# Patient Record
Sex: MALE | Race: WHITE | NOT HISPANIC OR LATINO | Employment: OTHER | URBAN - NONMETROPOLITAN AREA
[De-identification: names, ages, dates, MRNs, and addresses within clinical notes are randomized per-mention and may not be internally consistent; named-entity substitution may affect disease eponyms.]

---

## 2017-04-14 ENCOUNTER — OFFICE VISIT (OUTPATIENT)
Dept: CARDIOLOGY | Facility: CLINIC | Age: 82
End: 2017-04-14
Payer: MEDICARE

## 2017-04-14 VITALS
OXYGEN SATURATION: 94 % | HEIGHT: 69 IN | DIASTOLIC BLOOD PRESSURE: 80 MMHG | WEIGHT: 199 LBS | HEART RATE: 95 BPM | SYSTOLIC BLOOD PRESSURE: 130 MMHG | BODY MASS INDEX: 29.47 KG/M2

## 2017-04-14 DIAGNOSIS — R00.0 SINUS TACHYCARDIA: ICD-10-CM

## 2017-04-14 DIAGNOSIS — R60.0 BILATERAL EDEMA OF LOWER EXTREMITY: ICD-10-CM

## 2017-04-14 DIAGNOSIS — I10 ESSENTIAL HYPERTENSION: ICD-10-CM

## 2017-04-14 LAB — EKG IMPRESSION: NORMAL

## 2017-04-14 PROCEDURE — 4040F PNEUMOC VAC/ADMIN/RCVD: CPT | Mod: 8P | Performed by: INTERNAL MEDICINE

## 2017-04-14 PROCEDURE — 1036F TOBACCO NON-USER: CPT | Performed by: INTERNAL MEDICINE

## 2017-04-14 PROCEDURE — 99204 OFFICE O/P NEW MOD 45 MIN: CPT | Performed by: INTERNAL MEDICINE

## 2017-04-14 PROCEDURE — G8419 CALC BMI OUT NRM PARAM NOF/U: HCPCS | Performed by: INTERNAL MEDICINE

## 2017-04-14 PROCEDURE — G8432 DEP SCR NOT DOC, RNG: HCPCS | Performed by: INTERNAL MEDICINE

## 2017-04-14 PROCEDURE — 1101F PT FALLS ASSESS-DOCD LE1/YR: CPT | Mod: 8P | Performed by: INTERNAL MEDICINE

## 2017-04-14 PROCEDURE — 93000 ELECTROCARDIOGRAM COMPLETE: CPT | Performed by: INTERNAL MEDICINE

## 2017-04-14 RX ORDER — HYDROCORTISONE VALERATE CREAM 2 MG/G
CREAM TOPICAL PRN
COMMUNITY
Start: 2017-02-09 | End: 2019-01-24

## 2017-04-14 RX ORDER — LORATADINE 10 MG/1
TABLET ORAL
COMMUNITY
Start: 2017-03-07

## 2017-04-14 RX ORDER — TRIAMCINOLONE ACETONIDE 1 MG/G
CREAM TOPICAL
COMMUNITY
Start: 2017-03-15 | End: 2018-12-03

## 2017-04-14 RX ORDER — AMLODIPINE BESYLATE 5 MG/1
5 TABLET ORAL DAILY
Qty: 90 TAB | Refills: 3 | Status: SHIPPED | OUTPATIENT
Start: 2017-04-14 | End: 2018-05-09 | Stop reason: SDUPTHER

## 2017-04-14 RX ORDER — LISINOPRIL 40 MG/1
40 TABLET ORAL DAILY
Qty: 90 TAB | Refills: 3 | Status: SHIPPED | OUTPATIENT
Start: 2017-04-14 | End: 2018-05-09 | Stop reason: SDUPTHER

## 2017-04-14 ASSESSMENT — ENCOUNTER SYMPTOMS
ORTHOPNEA: 0
ABDOMINAL PAIN: 0
FALLS: 0
DIZZINESS: 0
PALPITATIONS: 0
SHORTNESS OF BREATH: 0
DEPRESSION: 0
PND: 0
BRUISES/BLEEDS EASILY: 0
LOSS OF CONSCIOUSNESS: 0

## 2017-04-14 NOTE — MR AVS SNAPSHOT
"        Feliciano Rojas   2017 9:40 AM   Office Visit   MRN: [id number]    Department:  Heart [facility]   Dept Phone:  [phone]    Description:  Male : 1935   Provider:  Anitra Ferrara M.D.           Reason for Visit     New Patient           Allergies as of 2017     No Known Allergies      You were diagnosed with     Essential hypertension   [2095828]       Bilateral edema of lower extremity   [850377]       Sinus tachycardia   [997809]         Vital Signs     Blood Pressure Pulse Height Weight Body Mass Index Oxygen Saturation    130/80 mmHg 95 1.753 m (5' 9.02\") 90.266 kg (199 lb) 29.37 kg/m2 94%    Smoking Status                   Former Smoker           Basic Information     Date Of Birth Sex Race Ethnicity Preferred Language    1935 Male White Non- English      Your appointments     2017  9:30 AM   Echo with ECHO CV   ECHO [facility] (--)    [address]  [facility] [address]   [phone]            Aug 08, 2017 10:00 AM   FOLLOW UP with Anitra Ferrara M.D.   [facility]    [address]  [facility] [id number] [phone]              Health Maintenance     Patient has no pending health maintenance at this time      Results     EKG      Component    Report    [facility]    Test Date:  2017  Pt Name:    FELICIANO ROJAS                  Department: [facility]  MRN:        [id number]                      Room:  Gender:                                 Technician: ANDRES  :        1935                   Requested By:ANITRA FERRARA  Order #:    [id number]                    Reading MD: Oliver Chin MD    Measurements  Intervals                                Axis  Rate:       82                           P:          73  OK:         164                          QRS:        81  QRSD:       86                           T:          49  QT:         372  QTc:        435    Interpretive " Statements  SINUS RHYTHM  BORDERLINE RIGHT AXIS DEVIATION  No previous ECG available for comparison    Electronically Signed On 4- 9:57:44 PDT by Oliver Chin MD                          Current Immunizations     No immunizations on file.      Below and/or attached are the medications your provider expects you to take. Review all of your home medications and newly ordered medications with your provider and/or pharmacist. Follow medication instructions as directed by your provider and/or pharmacist. Please keep your medication list with you and share with your provider. Update the information when medications are discontinued, doses are changed, or new medications (including over-the-counter products) are added; and carry medication information at all times in the event of emergency situations     Allergies:  No Known Allergies          Medications  Valid as of: April 14, 2017 - 10:10 AM    Generic Name Brand Name Tablet Size Instructions for use    AmLODIPine Besylate (Tab) NORVASC 5 MG Take 1 Tab by mouth every day.        Aspirin (Tablet Delayed Response) ECOTRIN 81 MG Take 81 mg by mouth every day.        Famotidine (Tab) PEPCID 20 MG Take 20 mg by mouth 2 times a day.        Hydrocortisone Valerate (Cream) WESTCORT 0.2 %         Lisinopril (Tab) PRINIVIL, ZESTRIL 40 MG Take 1 Tab by mouth every day.        Loratadine (Tab) CLARITIN 10 MG         Lovastatin (Tab) MEVACOR 20 MG Take 20 mg by mouth every evening.        Non Formulary Request Non Formulary Request  loratidine takes daily        Ondansetron HCl (Tab) ZOFRAN 4 MG Take 1 Tab by mouth every four hours as needed for Nausea/Vomiting.        ROPINIRole HCl (Tab) REQUIP 4 MG         Terazosin HCl (Cap) HYTRIN 10 MG Take 10 mg by mouth every evening.        Triamcinolone Acetonide (Cream) KENALOG 0.1 %         .                 Medicines prescribed today were sent to:     [facility] - [facility], [place] - [address] [place] [address]  [facility] [address]    Phone: [phone] Fax: [phone]    Open 24 Hours?: No      Medication refill instructions:       If your prescription bottle indicates you have medication refills left, it is not necessary to call your provider’s office. Please contact your pharmacy and they will refill your medication.    If your prescription bottle indicates you do not have any refills left, you may request refills at any time through one of the following ways: The online TrialPay system (except Urgent Care), by calling your provider’s office, or by asking your pharmacy to contact your provider’s office with a refill request. Medication refills are processed only during regular business hours and may not be available until the next business day. Your provider may request additional information or to have a follow-up visit with you prior to refilling your medication.   *Please Note: Medication refills are assigned a new Rx number when refilled electronically. Your pharmacy may indicate that no refills were authorized even though a new prescription for the same medication is available at the pharmacy. Please request the medicine by name with the pharmacy before contacting your provider for a refill.        Your To Do List     Future Labs/Procedures Complete By Expires    ECHOCARDIOGRAM COMP W/O CONT  As directed 4/14/2018    TSH WITH REFLEX TO FT4  As directed 4/14/2018         TrialPay Access Code: [email or web address]  Expires: 4/30/2017 11:27 AM    TrialPay  A secure, online tool to manage your health information     [employer]’s TrialPay® is a secure, online tool that connects you to your personalized health information from the privacy of your home -- day or night - making it very easy for you to manage your healthcare. Once the activation process is completed, you can even access your medical information using the TrialPay arthur, which is available for free in the Apple Arthur store or Google Play store.      [employer] provides the following levels of access (as shown below):   My Chart Features   [facility] Doctor [facility]  Specialists [facility]  Urgent  Care Non-[facility]  Primary Care  Doctor   Email your healthcare team securely and privately 24/7 X X X    Manage appointments: schedule your next appointment; view details of past/upcoming appointments X      Request prescription refills. X      View recent personal medical records, including lab and immunizations X X X X   View health record, including health history, allergies, medications X X X X   Read reports about your outpatient visits, procedures, consult and ER notes X X X X   See your discharge summary, which is a recap of your hospital and/or ER visit that includes your diagnosis, lab results, and care plan. X X       How to register for [employer]:  1. Go to  [email or web address].  2. Click on the Sign Up Now box, which takes you to the New Member Sign Up page. You will need to provide the following information:  a. Enter your [employer] Access Code exactly as it appears at the top of this page. (You will not need to use this code after you’ve completed the sign-up process. If you do not sign up before the expiration date, you must request a new code.)   b. Enter your date of birth.   c. Enter your home email address.   d. Click Submit, and follow the next screen’s instructions.  3. Create a [employer] ID. This will be your [employer] login ID and cannot be changed, so think of one that is secure and easy to remember.  4. Create a [employer] password. You can change your password at any time.  5. Enter your Password Reset Question and Answer. This can be used at a later time if you forget your password.   6. Enter your e-mail address. This allows you to receive e-mail notifications when new information is available in [employer].  7. Click Sign Up. You can now view your health information.    For assistance activating your [employer] account, call [phone]

## 2017-04-14 NOTE — PROGRESS NOTES
Subjective:   Feliciano Rojas is a 81 y.o. male who presents today             Past Medical History   Diagnosis Date   • Hypertension    • Cancer (CMS-HCC)      prostate ca   • Pneumonia    • Vertigo    • Indigestion    • BPH (benign prostatic hyperplasia)    • High cholesterol      Past Surgical History   Procedure Laterality Date   • Prostate cancer biomarker       seed implants prostate   • Appendectomy      • Cataract extraction with iol Bilateral    • Carpal tunnel release Right 12/17/2015     Procedure: CARPAL TUNNEL RELEASE;  Surgeon: Jeromy Santana D.O.;  Location: SURGERY [facility];  Service:      History reviewed. No pertinent family history.  History   Smoking status   • Former Smoker -- 2.00 packs/day for 15 years   • Types: Cigarettes   • Quit date: 12/16/1970   Smokeless tobacco   • Never Used     No Known Allergies  Outpatient Encounter Prescriptions as of 4/14/2017   Medication Sig Dispense Refill   • REQUIP 4 MG tablet      • loratadine (CLARITIN) 10 MG Tab      • amlodipine (NORVASC) 5 MG Tab Take 1 Tab by mouth every day. 90 Tab 3   • lisinopril (PRINIVIL, ZESTRIL) 40 MG tablet Take 1 Tab by mouth every day. 90 Tab 3   • terazosin (HYTRIN) 10 MG capsule Take 10 mg by mouth every evening.     • lovastatin (MEVACOR) 20 MG TABS Take 20 mg by mouth every evening.     • Non Formulary Request loratidine takes daily     • famotidine (PEPCID) 20 MG TABS Take 20 mg by mouth 2 times a day.     • aspirin EC (ECOTRIN) 81 MG TBEC Take 81 mg by mouth every day.     • hydrocortisone (WESTCORT) 0.2 % cream      • triamcinolone acetonide (KENALOG) 0.1 % Cream      • [DISCONTINUED] lisinopril (PRINIVIL) 20 MG Tab Take 40 mg by mouth every day.     • ondansetron (ZOFRAN) 4 MG TABS Take 1 Tab by mouth every four hours as needed for Nausea/Vomiting. 20 Tab 1   • [DISCONTINUED] amlodipine (NORVASC) 10 MG TABS Take 5 mg by mouth every day.       No facility-administered encounter medications on file as of  "4/14/2017.     Review of Systems   Constitutional: Negative for malaise/fatigue.   HENT: Negative.    Respiratory: Negative for shortness of breath.    Cardiovascular: Positive for leg swelling. Negative for chest pain, palpitations, orthopnea and PND.   Gastrointestinal: Negative for abdominal pain.   Musculoskeletal: Negative for falls.   Skin: Negative.    Neurological: Negative for dizziness and loss of consciousness.   Endo/Heme/Allergies: Does not bruise/bleed easily.   Psychiatric/Behavioral: Negative for depression.   All other systems reviewed and are negative.       Objective:   /80 mmHg  Pulse 95  Ht 1.753 m (5' 9.02\")  Wt 90.266 kg (199 lb)  BMI 29.37 kg/m2  SpO2 94%    Physical Exam   Constitutional: He is oriented to person, place, and time. No distress.   HENT:   Head: Normocephalic and atraumatic.   Eyes: Conjunctivae are normal.   Neck: Normal range of motion. Neck supple. No JVD present.   Cardiovascular: Normal rate, regular rhythm and normal heart sounds.  Exam reveals no gallop and no friction rub.    No murmur heard.  Pulmonary/Chest: Effort normal and breath sounds normal. No respiratory distress. He has no wheezes. He has no rales.   Abdominal: Soft. There is no tenderness.   Musculoskeletal: He exhibits edema (1+ b/l).   Neurological: He is alert and oriented to person, place, and time.   Skin: Skin is warm and dry. He is not diaphoretic.   Psychiatric: He has a normal mood and affect.   Nursing note and vitals reviewed.    Recent labs reviewed. CBC unremarkable except hemoglobin of 12.4. Chem-7 unremarkable. Creatinine 1.3, unchanged when compared to studies from last year.    ECG from today was reviewed and showed normal sinus rhythm at 82 bpm, otherwise normal.      Assessment:     1. Essential hypertension  EKG    amlodipine (NORVASC) 5 MG Tab    lisinopril (PRINIVIL, ZESTRIL) 40 MG tablet   2. Bilateral edema of lower extremity  ECHOCARDIOGRAM COMP W/O CONT   3. Sinus " tachycardia  TSH WITH REFLEX TO FT4       Medical Decision Making:  Today's Assessment / Status / Plan:     Bilateral lower extremity edema-likely secondary to use of high-dose amlodipine. Agree with decreasing amlodipine dose to 5 mg which has already been done. His edema could also be secondary to venous stasis. I advised patient about some lifestyle changes to help decrease the edema. Agree with use of compression stockings. Patient doesn't have any other symptoms distress heart failure however, I will obtain an echocardiogram to evaluate his LV function.     Patient reports a history of elevated heart rates. He reports that his heart rate is high 90s to over 100 bpm. No associated symptoms. Will obtain TSH.     Hypertension-blood pressure at goal. Continue current regimen. Patient would like to follow me long-term for hypertension management and I have reordered his medications per his request.    Return to clinic in 3 months or prn.    Thank you for allowing me to participate in the care of this patient. Please do not hesitate to contact me with any questions.    Anitra Ellis MD  Cardiologist  [facility]

## 2017-04-28 ENCOUNTER — TELEPHONE (OUTPATIENT)
Dept: CARDIOLOGY | Facility: MEDICAL CENTER | Age: 82
End: 2017-04-28

## 2017-04-28 NOTE — TELEPHONE ENCOUNTER
----- Message from Grace Sims, Med Ass't sent at 4/28/2017  9:16 AM PDT -----  Regarding: ECHO RESULTS  Good morning, pt called and would like a call back in regards to his echo test.      Thank you    Leslye          Above note to Dr. Ellis to review echo done in [facility].     jlf    Dr. Ellis reviews patients echo,  Echo looks good and no changes.  Advised to keep f/u appt as scheduled.  Patient is notified of the same.   jlf

## 2017-09-01 ENCOUNTER — OFFICE VISIT (OUTPATIENT)
Dept: CARDIOLOGY | Facility: CLINIC | Age: 82
End: 2017-09-01
Payer: MEDICARE

## 2017-09-01 VITALS
BODY MASS INDEX: 29.62 KG/M2 | WEIGHT: 200 LBS | DIASTOLIC BLOOD PRESSURE: 76 MMHG | HEART RATE: 92 BPM | HEIGHT: 69 IN | OXYGEN SATURATION: 93 % | SYSTOLIC BLOOD PRESSURE: 138 MMHG

## 2017-09-01 DIAGNOSIS — R60.0 BILATERAL EDEMA OF LOWER EXTREMITY: ICD-10-CM

## 2017-09-01 DIAGNOSIS — E78.5 OTHER AND UNSPECIFIED HYPERLIPIDEMIA: ICD-10-CM

## 2017-09-01 DIAGNOSIS — I10 ESSENTIAL HYPERTENSION: ICD-10-CM

## 2017-09-01 PROCEDURE — 99214 OFFICE O/P EST MOD 30 MIN: CPT | Performed by: INTERNAL MEDICINE

## 2017-09-01 RX ORDER — DULOXETIN HYDROCHLORIDE 30 MG/1
30 CAPSULE, DELAYED RELEASE ORAL DAILY
COMMUNITY

## 2017-09-01 ASSESSMENT — ENCOUNTER SYMPTOMS
DEPRESSION: 0
BRUISES/BLEEDS EASILY: 0
LOSS OF CONSCIOUSNESS: 0
PND: 0
FALLS: 0
ORTHOPNEA: 0
SHORTNESS OF BREATH: 0
ABDOMINAL PAIN: 0
PALPITATIONS: 0
DIZZINESS: 0

## 2017-09-01 NOTE — PROGRESS NOTES
Subjective:   Feliciano Rojas is a 81 y.o. male With past medical history significant for hypertension and hyperlipidemia who is planning to clinic for follow-up. Since his last visit, his lower extremity edema has essentially resolved. He denies any PND or orthopnea. No chest discomfort, dyspnea, palpitations or dizziness.    He does add salt to most of his meals. He is trying to reduce his salt intake however.     He does not exercise, but does like to go shopping about twice a week and at that time walks the entire store without any symptoms.    Past Medical History:   Diagnosis Date   • BPH (benign prostatic hyperplasia)    • Cancer (CMS-HCC)     prostate ca   • High cholesterol    • Hypertension    • Indigestion    • Pneumonia    • Vertigo      Past Surgical History:   Procedure Laterality Date   • CARPAL TUNNEL RELEASE Right 12/17/2015    Procedure: CARPAL TUNNEL RELEASE;  Surgeon: Jeromy Santana D.O.;  Location: SURGERY [facility];  Service:    • APPENDECTOMY      • CATARACT EXTRACTION WITH IOL Bilateral    • PROSTATE CANCER BIOMARKER      seed implants prostate     History reviewed. No pertinent family history.  History   Smoking Status   • Former Smoker   • Packs/day: 2.00   • Years: 15.00   • Types: Cigarettes   • Quit date: 12/16/1970   Smokeless Tobacco   • Never Used     Occasionally alcohol use. No recreational drug use.    No Known Allergies  Outpatient Encounter Prescriptions as of 9/1/2017   Medication Sig Dispense Refill   • SIMVASTATIN PO Take  by mouth.     • duloxetine (CYMBALTA) 30 MG Cap DR Particles Take 30 mg by mouth every day.     • REQUIP 4 MG tablet      • hydrocortisone (WESTCORT) 0.2 % cream      • triamcinolone acetonide (KENALOG) 0.1 % Cream      • loratadine (CLARITIN) 10 MG Tab      • amlodipine (NORVASC) 5 MG Tab Take 1 Tab by mouth every day. 90 Tab 3   • lisinopril (PRINIVIL, ZESTRIL) 40 MG tablet Take 1 Tab by mouth every day. 90 Tab 3   • terazosin (HYTRIN) 10 MG  "capsule Take 10 mg by mouth every evening.     • famotidine (PEPCID) 20 MG TABS Take 20 mg by mouth 2 times a day.     • aspirin EC (ECOTRIN) 81 MG TBEC Take 81 mg by mouth every day.     • [DISCONTINUED] lovastatin (MEVACOR) 20 MG TABS Take 20 mg by mouth every evening.     • [DISCONTINUED] Non Formulary Request loratidine takes daily     • [DISCONTINUED] ondansetron (ZOFRAN) 4 MG TABS Take 1 Tab by mouth every four hours as needed for Nausea/Vomiting. 20 Tab 1     No facility-administered encounter medications on file as of 9/1/2017.      Review of Systems   Constitutional: Negative for malaise/fatigue.   HENT: Negative.    Respiratory: Negative for shortness of breath.    Cardiovascular: Negative for chest pain, palpitations, orthopnea, leg swelling and PND.   Gastrointestinal: Negative for abdominal pain.   Musculoskeletal: Negative for falls.   Skin: Negative.    Neurological: Negative for dizziness and loss of consciousness.   Endo/Heme/Allergies: Does not bruise/bleed easily.   Psychiatric/Behavioral: Negative for depression.   All other systems reviewed and are negative.       Objective:   /76   Pulse 92   Ht 1.753 m (5' 9\")   Wt 90.7 kg (200 lb)   SpO2 93%   BMI 29.53 kg/m²     Physical Exam   Constitutional: He is oriented to person, place, and time. No distress.   HENT:   Head: Normocephalic and atraumatic.   Eyes: Conjunctivae are normal.   Neck: Normal range of motion. Neck supple. No JVD present.   Cardiovascular: Normal rate, regular rhythm and normal heart sounds.  Exam reveals no gallop and no friction rub.    No murmur heard.  Pulmonary/Chest: Effort normal and breath sounds normal. No respiratory distress. He has no wheezes. He has no rales.   Abdominal: Soft. There is no tenderness.   Musculoskeletal: He exhibits no edema.   Neurological: He is alert and oriented to person, place, and time.   Skin: Skin is warm and dry. He is not diaphoretic.   Psychiatric: He has a normal mood and " affect.   Nursing note and vitals reviewed.    Labs in April 2017. Hemoglobin normal. Creatinine normal.   TSH normal.     ECG from April 2017 showed normal sinus rhythm at 82 bpm, otherwise normal.    Echocardiogram in April 2017. Report reviewed. Showed mild concentric LVH. EF 70%.  Mild RVH with mild reduction in RV function. RVSP 34 mmHg.    Assessment:     1. Essential hypertension     2. Bilateral edema of lower extremity     3. Other and unspecified hyperlipidemia         Medical Decision Making:  Today's Assessment / Status / Plan:     Patient's bilateral lower extremity edema has not resolved. It was likely secondary to high dose amlodipine. He is tolerating the 5 mg of amlodipine well. I strongly advised the patient to limit his salt intake. His echocardiogram showed normal LV function.    His blood pressure is at goal. Continue current regimen.    He is currently on simvastatin and is tolerating it well. No recent lipid panel in the system.    Patient is requesting refills of his duloxetine. I explained to the patient that I cannot prescribe that medicine for him. However I will forward his request to patient's primary care physician.    Return to clinic in 6 months or earlier if needed.    Thank you for allowing me to participate in the care of this patient. Please do not hesitate to contact me with any questions.    Anitra Ellis MD  Cardiologist  [facility]      PLEASE NOTE: This dictation was created using voice recognition software.

## 2017-09-01 NOTE — LETTER
[facility] [place] [address],   [address] [place] [address]  Phone: [phone]  Fax: [phone]              Dear Dr. Jordan,     It was a pleasure to see your patient in Cardiology clinic. Please see my note from today attached below.     Patient is requesting refills on his Duloxetine 30mg once a day. He would like a 90 day supply.   [facility] and would [facility] to get it through [facility] now. I will defer to you about refilling this medication. Thank you    Sincerely,    Anitra Ellis MD        Feliciano Rojas  1935    Encounter Date: 9/1/2017      PROGRESS NOTE:  Subjective:   Feliciano Rojas is a 81 y.o. male With past medical history significant for hypertension and hyperlipidemia who is planning to clinic for follow-up. Since his last visit, his lower extremity edema has essentially resolved. He denies any PND or orthopnea. No chest discomfort, dyspnea, palpitations or dizziness.    He does add salt to most of his meals. He is trying to reduce his salt intake however.     He does not exercise, but does like to go shopping about twice a week and at that time walks the entire store without any symptoms.    Past Medical History:   Diagnosis Date   • BPH (benign prostatic hyperplasia)    • Cancer (CMS-HCC)     prostate ca   • High cholesterol    • Hypertension    • Indigestion    • Pneumonia    • Vertigo      Past Surgical History:   Procedure Laterality Date   • CARPAL TUNNEL RELEASE Right 12/17/2015    Procedure: CARPAL TUNNEL RELEASE;  Surgeon: Jeromy Santana D.O.;  Location: SURGERY [facility];  Service:    • APPENDECTOMY      • CATARACT EXTRACTION WITH IOL Bilateral    • PROSTATE CANCER BIOMARKER      seed implants prostate     History reviewed. No pertinent family history.  History   Smoking Status   • Former Smoker   • Packs/day: 2.00   • Years: 15.00   • Types: Cigarettes   • Quit  "date: 12/16/1970   Smokeless Tobacco   • Never Used     Occasionally alcohol use. No recreational drug use.    No Known Allergies  Outpatient Encounter Prescriptions as of 9/1/2017   Medication Sig Dispense Refill   • SIMVASTATIN PO Take  by mouth.     • duloxetine (CYMBALTA) 30 MG Cap DR Particles Take 30 mg by mouth every day.     • REQUIP 4 MG tablet      • hydrocortisone (WESTCORT) 0.2 % cream      • triamcinolone acetonide (KENALOG) 0.1 % Cream      • loratadine (CLARITIN) 10 MG Tab      • amlodipine (NORVASC) 5 MG Tab Take 1 Tab by mouth every day. 90 Tab 3   • lisinopril (PRINIVIL, ZESTRIL) 40 MG tablet Take 1 Tab by mouth every day. 90 Tab 3   • terazosin (HYTRIN) 10 MG capsule Take 10 mg by mouth every evening.     • famotidine (PEPCID) 20 MG TABS Take 20 mg by mouth 2 times a day.     • aspirin EC (ECOTRIN) 81 MG TBEC Take 81 mg by mouth every day.     • [DISCONTINUED] lovastatin (MEVACOR) 20 MG TABS Take 20 mg by mouth every evening.     • [DISCONTINUED] Non Formulary Request loratidine takes daily     • [DISCONTINUED] ondansetron (ZOFRAN) 4 MG TABS Take 1 Tab by mouth every four hours as needed for Nausea/Vomiting. 20 Tab 1     No facility-administered encounter medications on file as of 9/1/2017.      Review of Systems   Constitutional: Negative for malaise/fatigue.   HENT: Negative.    Respiratory: Negative for shortness of breath.    Cardiovascular: Negative for chest pain, palpitations, orthopnea, leg swelling and PND.   Gastrointestinal: Negative for abdominal pain.   Musculoskeletal: Negative for falls.   Skin: Negative.    Neurological: Negative for dizziness and loss of consciousness.   Endo/Heme/Allergies: Does not bruise/bleed easily.   Psychiatric/Behavioral: Negative for depression.   All other systems reviewed and are negative.       Objective:   /76   Pulse 92   Ht 1.753 m (5' 9\")   Wt 90.7 kg (200 lb)   SpO2 93%   BMI 29.53 kg/m²      Physical Exam   Constitutional: He is " oriented to person, place, and time. No distress.   HENT:   Head: Normocephalic and atraumatic.   Eyes: Conjunctivae are normal.   Neck: Normal range of motion. Neck supple. No JVD present.   Cardiovascular: Normal rate, regular rhythm and normal heart sounds.  Exam reveals no gallop and no friction rub.    No murmur heard.  Pulmonary/Chest: Effort normal and breath sounds normal. No respiratory distress. He has no wheezes. He has no rales.   Abdominal: Soft. There is no tenderness.   Musculoskeletal: He exhibits no edema.   Neurological: He is alert and oriented to person, place, and time.   Skin: Skin is warm and dry. He is not diaphoretic.   Psychiatric: He has a normal mood and affect.   Nursing note and vitals reviewed.    Labs in April 2017. Hemoglobin normal. Creatinine normal.   TSH normal.     ECG from April 2017 showed normal sinus rhythm at 82 bpm, otherwise normal.    Echocardiogram in April 2017. Report reviewed. Showed mild concentric LVH. EF 70%.  Mild RVH with mild reduction in RV function. RVSP 34 mmHg.    Assessment:     1. Essential hypertension     2. Bilateral edema of lower extremity     3. Other and unspecified hyperlipidemia         Medical Decision Making:  Today's Assessment / Status / Plan:     Patient's bilateral lower extremity edema has not resolved. It was likely secondary to high dose amlodipine. He is tolerating the 5 mg of amlodipine well. I strongly advised the patient to limit his salt intake. His echocardiogram showed normal LV function.    His blood pressure is at goal. Continue current regimen.    He is currently on simvastatin and is tolerating it well. No recent lipid panel in the system.    Patient is requesting refills of his duloxetine. I explained to the patient that I cannot prescribe that medicine for him. However I will forward his request to patient's primary care physician.    Return to clinic in 6 months or earlier if needed.    Thank you for allowing me to  participate in the care of this patient. Please do not hesitate to contact me with any questions.    Anitra Ellis MD  Cardiologist  [facility]      PLEASE NOTE: This dictation was created using voice recognition software.           No Recipients

## 2018-03-06 ENCOUNTER — OFFICE VISIT (OUTPATIENT)
Dept: CARDIOLOGY | Facility: CLINIC | Age: 83
End: 2018-03-06
Payer: MEDICARE

## 2018-03-06 VITALS
DIASTOLIC BLOOD PRESSURE: 78 MMHG | WEIGHT: 207 LBS | BODY MASS INDEX: 30.66 KG/M2 | SYSTOLIC BLOOD PRESSURE: 130 MMHG | HEART RATE: 94 BPM | HEIGHT: 69 IN | OXYGEN SATURATION: 93 %

## 2018-03-06 DIAGNOSIS — G47.33 OSA (OBSTRUCTIVE SLEEP APNEA): ICD-10-CM

## 2018-03-06 DIAGNOSIS — E78.49 OTHER HYPERLIPIDEMIA: ICD-10-CM

## 2018-03-06 DIAGNOSIS — I10 ESSENTIAL HYPERTENSION: ICD-10-CM

## 2018-03-06 PROBLEM — E78.5 OTHER AND UNSPECIFIED HYPERLIPIDEMIA: Status: RESOLVED | Noted: 2017-09-01 | Resolved: 2018-03-06

## 2018-03-06 PROCEDURE — 99214 OFFICE O/P EST MOD 30 MIN: CPT | Performed by: INTERNAL MEDICINE

## 2018-03-06 ASSESSMENT — ENCOUNTER SYMPTOMS
SHORTNESS OF BREATH: 0
PND: 0
DEPRESSION: 0
ABDOMINAL PAIN: 0
FALLS: 0
BRUISES/BLEEDS EASILY: 0
ORTHOPNEA: 0
LOSS OF CONSCIOUSNESS: 0
PALPITATIONS: 0
DIZZINESS: 0

## 2018-03-06 NOTE — PROGRESS NOTES
Subjective:   Feliciano Rojas is a 82 y.o. male presenting to clinic for follow-up on his hypertension. 2 weeks ago, patient forgot to put amlodipine and his pillbox and his blood pressure increased to about 160s systolic. Since last week he has re-added the medication back to his regimen and his blood pressures are now mostly 130s to 140s systolic. He continues to have a lot of salt to his meals.    He has not been exercising much. Does not like to go on walks either. Has gained about 7 pounds in the past 6 months.    He has obstructive sleep apnea for which he is compliant with his CPAP.    For his hyperlipidemia he has been compliant with his simvastatin without any side effects.    Past Medical History:   Diagnosis Date   • BPH (benign prostatic hyperplasia)    • Cancer (CMS-HCC)     prostate ca   • High cholesterol    • Hypertension    • Indigestion    • Pneumonia    • Vertigo      Past Surgical History:   Procedure Laterality Date   • CARPAL TUNNEL RELEASE Right 12/17/2015    Procedure: CARPAL TUNNEL RELEASE;  Surgeon: Jeromy Santana D.O.;  [facility]: [facility];  Service:    • APPENDECTOMY      • CATARACT EXTRACTION WITH IOL Bilateral    • PROSTATE CANCER BIOMARKER      seed implants prostate     History reviewed. No pertinent family history.  History   Smoking Status   • Former Smoker   • Packs/day: 2.00   • Years: 15.00   • Types: Cigarettes   • Quit date: 12/16/1970   Smokeless Tobacco   • Never Used     Occasionally alcohol use. No recreational drug use.    No Known Allergies  Outpatient Encounter Prescriptions as of 3/6/2018   Medication Sig Dispense Refill   • SIMVASTATIN PO Take  by mouth.     • duloxetine (CYMBALTA) 30 MG Cap DR Particles Take 30 mg by mouth every day.     • REQUIP 4 MG tablet      • hydrocortisone (WESTCORT) 0.2 % cream      • triamcinolone acetonide (KENALOG) 0.1 % Cream      • loratadine (CLARITIN) 10 MG Tab      • amlodipine (NORVASC) 5 MG Tab Take 1 Tab by mouth  "every day. 90 Tab 3   • lisinopril (PRINIVIL, ZESTRIL) 40 MG tablet Take 1 Tab by mouth every day. 90 Tab 3   • terazosin (HYTRIN) 10 MG capsule Take 10 mg by mouth every evening.     • famotidine (PEPCID) 20 MG TABS Take 20 mg by mouth 2 times a day.     • aspirin EC (ECOTRIN) 81 MG TBEC Take 81 mg by mouth every day.       No facility-administered encounter medications on file as of 3/6/2018.      Review of Systems   Constitutional: Negative for malaise/fatigue.   Respiratory: Negative for shortness of breath.    Cardiovascular: Negative for chest pain, palpitations, orthopnea, leg swelling and PND.   Gastrointestinal: Negative for abdominal pain.   Musculoskeletal: Negative for falls.   Neurological: Negative for dizziness and loss of consciousness.   Endo/Heme/Allergies: Does not bruise/bleed easily.   Psychiatric/Behavioral: Negative for depression.   All other systems reviewed and are negative.       Objective:   /78   Pulse 94   Ht 1.753 m (5' 9\")   Wt 93.9 kg (207 lb)   SpO2 93%   BMI 30.57 kg/m²     Physical Exam   Constitutional: He is oriented to person, place, and time. No distress.   HENT:   Head: Normocephalic and atraumatic.   Eyes: Conjunctivae are normal.   Neck: Normal range of motion. Neck supple. No JVD present.   Cardiovascular: Normal rate, regular rhythm and normal heart sounds.  Exam reveals no gallop and no friction rub.    No murmur heard.  Pulmonary/Chest: Effort normal and breath sounds normal. No respiratory distress. He has no wheezes. He has no rales.   Abdominal: Soft. There is no tenderness.   Musculoskeletal: He exhibits no edema.   Neurological: He is alert and oriented to person, place, and time.   Skin: Skin is warm and dry. He is not diaphoretic.   Psychiatric: He has a normal mood and affect.   Nursing note and vitals reviewed.    Echocardiogram in April 2017 showed mild concentric LVH. EF 70%.  Mild RVH with mild reduction in RV function. RVSP 34 mmHg.    Labs " performed in December 2017 were reviewed and showed hemoglobin 12.7. Creatinine 1.3. No changes from prior labs. LDL 90.    Assessment:     1. Essential hypertension     2. Other hyperlipidemia     3. GALE (obstructive sleep apnea)         Medical Decision Making:  Today's Assessment / Status / Plan:     Hypertension: Blood pressure is at goal today. Continue lisinopril and amlodipine at current dose. His renal function has been stable.  Majority of the time today was spent discussing the importance of avoiding salt in his meals. I have also advised the patient to start a low-level aerobic exercise regimen and increase as tolerated.    Hyperlipidemia: Lipids at goal. Continue simvastatin at current dose.    Obstructive sleep apnea: Patient is compliant with his CPAP.    Return to clinic in 6 months or earlier if needed.    Thank you for allowing me to participate in the care of this patient. Please do not hesitate to contact me with any questions.    Anitra Ellis MD  Cardiologist  [facility]      PLEASE NOTE: This dictation was created using voice recognition software.

## 2018-03-06 NOTE — LETTER
[facility] [address],   [address] [place] [address]  Phone: [phone]  Fax: [phone]              Feliciano Rojas  1935    Encounter Date: 3/6/2018    Anitra Ellis M.D.          PROGRESS NOTE:  Subjective:   Feliciano Rojas is a 82 y.o. male presenting to clinic for follow-up on his hypertension. 2 weeks ago, patient forgot to put amlodipine and his pillbox and his blood pressure increased to about 160s systolic. Since last week he has re-added the medication back to his regimen and his blood pressures are now mostly 130s to 140s systolic. He continues to have a lot of salt to his meals.    He has not been exercising much. Does not like to go on walks either. Has gained about 7 pounds in the past 6 months.    He has obstructive sleep apnea for which he is compliant with his CPAP.    For his hyperlipidemia he has been compliant with his simvastatin without any side effects.    Past Medical History:   Diagnosis Date   • BPH (benign prostatic hyperplasia)    • Cancer (CMS-HCC)     prostate ca   • High cholesterol    • Hypertension    • Indigestion    • Pneumonia    • Vertigo      Past Surgical History:   Procedure Laterality Date   • CARPAL TUNNEL RELEASE Right 12/17/2015    Procedure: CARPAL TUNNEL RELEASE;  Surgeon: Jeromy Santana D.O.;  Location: SURGERY [facility];  Service:    • APPENDECTOMY      • CATARACT EXTRACTION WITH IOL Bilateral    • PROSTATE CANCER BIOMARKER      seed implants prostate     History reviewed. No pertinent family history.  History   Smoking Status   • Former Smoker   • Packs/day: 2.00   • Years: 15.00   • Types: Cigarettes   • Quit date: 12/16/1970   Smokeless Tobacco   • Never Used     Occasionally alcohol use. No recreational drug use.    No Known Allergies  Outpatient Encounter Prescriptions as of 3/6/2018   Medication Sig Dispense Refill   • SIMVASTATIN PO Take  by mouth.     • duloxetine  "(CYMBALTA) 30 MG Cap DR Particles Take 30 mg by mouth every day.     • REQUIP 4 MG tablet      • hydrocortisone (WESTCORT) 0.2 % cream      • triamcinolone acetonide (KENALOG) 0.1 % Cream      • loratadine (CLARITIN) 10 MG Tab      • amlodipine (NORVASC) 5 MG Tab Take 1 Tab by mouth every day. 90 Tab 3   • lisinopril (PRINIVIL, ZESTRIL) 40 MG tablet Take 1 Tab by mouth every day. 90 Tab 3   • terazosin (HYTRIN) 10 MG capsule Take 10 mg by mouth every evening.     • famotidine (PEPCID) 20 MG TABS Take 20 mg by mouth 2 times a day.     • aspirin EC (ECOTRIN) 81 MG TBEC Take 81 mg by mouth every day.       No facility-administered encounter medications on file as of 3/6/2018.      Review of Systems   Constitutional: Negative for malaise/fatigue.   Respiratory: Negative for shortness of breath.    Cardiovascular: Negative for chest pain, palpitations, orthopnea, leg swelling and PND.   Gastrointestinal: Negative for abdominal pain.   Musculoskeletal: Negative for falls.   Neurological: Negative for dizziness and loss of consciousness.   Endo/Heme/Allergies: Does not bruise/bleed easily.   Psychiatric/Behavioral: Negative for depression.   All other systems reviewed and are negative.       Objective:   /78   Pulse 94   Ht 1.753 m (5' 9\")   Wt 93.9 kg (207 lb)   SpO2 93%   BMI 30.57 kg/m²      Physical Exam   Constitutional: He is oriented to person, place, and time. No distress.   HENT:   Head: Normocephalic and atraumatic.   Eyes: Conjunctivae are normal.   Neck: Normal range of motion. Neck supple. No JVD present.   Cardiovascular: Normal rate, regular rhythm and normal heart sounds.  Exam reveals no gallop and no friction rub.    No murmur heard.  Pulmonary/Chest: Effort normal and breath sounds normal. No respiratory distress. He has no wheezes. He has no rales.   Abdominal: Soft. There is no tenderness.   Musculoskeletal: He exhibits no edema.   Neurological: He is alert and oriented to person, place, and " time.   Skin: Skin is warm and dry. He is not diaphoretic.   Psychiatric: He has a normal mood and affect.   Nursing note and vitals reviewed.    Echocardiogram in April 2017 showed mild concentric LVH. EF 70%.  Mild RVH with mild reduction in RV function. RVSP 34 mmHg.    Labs performed in December 2017 were reviewed and showed hemoglobin 12.7. Creatinine 1.3. No changes from prior labs. LDL 90.    Assessment:     1. Essential hypertension     2. Other hyperlipidemia     3. GALE (obstructive sleep apnea)         Medical Decision Making:  Today's Assessment / Status / Plan:     Hypertension: Blood pressure is at goal today. Continue lisinopril and amlodipine at current dose. His renal function has been stable.  Majority of the time today was spent discussing the importance of avoiding salt in his meals. I have also advised the patient to start a low-level aerobic exercise regimen and increase as tolerated.    Hyperlipidemia: Lipids at goal. Continue simvastatin at current dose.    Obstructive sleep apnea: Patient is compliant with his CPAP.    Return to clinic in 6 months or earlier if needed.    Thank you for allowing me to participate in the care of this patient. Please do not hesitate to contact me with any questions.    Anitra Ellis MD  Cardiologist  [facility]      PLEASE NOTE: This dictation was created using voice recognition software.           Althea Jordan M.D.  [address] [facility] [address]  VIA Facsimile: [phone]

## 2018-05-09 DIAGNOSIS — I10 ESSENTIAL HYPERTENSION: ICD-10-CM

## 2018-05-09 RX ORDER — LISINOPRIL 40 MG/1
40 TABLET ORAL DAILY
Qty: 90 TAB | Refills: 3 | Status: SHIPPED | OUTPATIENT
Start: 2018-05-09 | End: 2019-04-16 | Stop reason: SDUPTHER

## 2018-05-09 RX ORDER — AMLODIPINE BESYLATE 5 MG/1
5 TABLET ORAL DAILY
Qty: 90 TAB | Refills: 3 | Status: SHIPPED | OUTPATIENT
Start: 2018-05-09 | End: 2019-04-16 | Stop reason: SDUPTHER

## 2018-09-11 ENCOUNTER — OFFICE VISIT (OUTPATIENT)
Dept: CARDIOLOGY | Facility: CLINIC | Age: 83
End: 2018-09-11
Payer: MEDICARE

## 2018-09-11 VITALS
WEIGHT: 210 LBS | BODY MASS INDEX: 31.1 KG/M2 | SYSTOLIC BLOOD PRESSURE: 120 MMHG | DIASTOLIC BLOOD PRESSURE: 70 MMHG | OXYGEN SATURATION: 95 % | HEIGHT: 69 IN | HEART RATE: 95 BPM

## 2018-09-11 DIAGNOSIS — G47.33 OSA (OBSTRUCTIVE SLEEP APNEA): ICD-10-CM

## 2018-09-11 DIAGNOSIS — E78.49 OTHER HYPERLIPIDEMIA: ICD-10-CM

## 2018-09-11 DIAGNOSIS — I10 ESSENTIAL HYPERTENSION: ICD-10-CM

## 2018-09-11 DIAGNOSIS — R00.0 TACHYCARDIA: ICD-10-CM

## 2018-09-11 PROCEDURE — 99215 OFFICE O/P EST HI 40 MIN: CPT | Performed by: INTERNAL MEDICINE

## 2018-09-11 RX ORDER — LOVASTATIN 20 MG/1
20 TABLET ORAL NIGHTLY
COMMUNITY

## 2018-09-11 RX ORDER — FINASTERIDE 5 MG/1
TABLET, FILM COATED ORAL
COMMUNITY
Start: 2018-07-08 | End: 2018-12-03

## 2018-09-11 RX ORDER — ASCORBIC ACID 500 MG
500 TABLET ORAL DAILY
COMMUNITY

## 2018-09-11 ASSESSMENT — ENCOUNTER SYMPTOMS
SHORTNESS OF BREATH: 0
BRUISES/BLEEDS EASILY: 0
ORTHOPNEA: 0
LOSS OF CONSCIOUSNESS: 0
DIZZINESS: 0
PALPITATIONS: 0
ABDOMINAL PAIN: 0
FALLS: 0
DEPRESSION: 0
PND: 0

## 2018-09-11 NOTE — LETTER
[facility] [place] [address],   [address] [place] [address]  Phone: [phone]  Fax: [phone]              Feliciano Rojas  1935    Encounter Date: 9/11/2018    Anitra Ellis M.D.          PROGRESS NOTE:  Subjective:   Feliciano Rojas is a 83 y.o. male presenting to clinic for follow-up on hypertension.    Pertinent history:  Hypertension  Hyperlipidemia  Obstructive sleep apnea on CPAP    His main concern today is tachycardia that he has noted at home.  His heart rate is almost always in the 100s or higher when he checks it at home.  He denies any associated palpitations or dizziness.    His blood pressures are usually well controlled, like today.    He has obstructive sleep apnea and has been compliant with his CPAP.  Does not like using it however as he does not care much for the mask.    He is currently on lovastatin for his hyperlipidemia which is tolerating well.      Past Medical History:   Diagnosis Date   • BPH (benign prostatic hyperplasia)    • Cancer (HCC)     prostate ca   • High cholesterol    • Hypertension    • Indigestion    • Pneumonia    • Vertigo      Past Surgical History:   Procedure Laterality Date   • CARPAL TUNNEL RELEASE Right 12/17/2015    Procedure: CARPAL TUNNEL RELEASE;  Surgeon: Jeromy Santana D.O.;  Location: SURGERY [facility];  Service:    • APPENDECTOMY      • CATARACT EXTRACTION WITH IOL Bilateral    • PROSTATE CANCER BIOMARKER      seed implants prostate     History reviewed. No pertinent family history.  History   Smoking Status   • Former Smoker   • Packs/day: 2.00   • Years: 15.00   • Types: Cigarettes   • Quit date: 12/16/1970   Smokeless Tobacco   • Never Used     Occasionally alcohol use. No recreational drug use.    No Known Allergies  Outpatient Encounter Prescriptions as of 9/11/2018   Medication Sig Dispense Refill   • lovastatin (MEVACOR) 20 MG Tab Take 20 mg by mouth every evening.   "   • finasteride (PROSCAR) 5 MG Tab      • ascorbic acid (ASCORBIC ACID) 500 MG Tab Take 500 mg by mouth every day.     • amLODIPine (NORVASC) 5 MG Tab Take 1 Tab by mouth every day. 90 Tab 3   • lisinopril (PRINIVIL, ZESTRIL) 40 MG tablet Take 1 Tab by mouth every day. 90 Tab 3   • duloxetine (CYMBALTA) 30 MG Cap DR Particles Take 30 mg by mouth every day.     • REQUIP 4 MG tablet      • hydrocortisone (WESTCORT) 0.2 % cream      • triamcinolone acetonide (KENALOG) 0.1 % Cream      • loratadine (CLARITIN) 10 MG Tab      • terazosin (HYTRIN) 10 MG capsule Take 10 mg by mouth 2 times a day.     • famotidine (PEPCID) 20 MG TABS Take 20 mg by mouth 2 times a day.     • aspirin EC (ECOTRIN) 81 MG TBEC Take 81 mg by mouth every day.     • [DISCONTINUED] SIMVASTATIN PO Take  by mouth.       No facility-administered encounter medications on file as of 9/11/2018.      Review of Systems   Constitutional: Negative for malaise/fatigue.   Respiratory: Negative for shortness of breath.    Cardiovascular: Negative for chest pain, palpitations, orthopnea, leg swelling and PND.   Gastrointestinal: Negative for abdominal pain.   Musculoskeletal: Negative for falls.   Neurological: Negative for dizziness and loss of consciousness.   Endo/Heme/Allergies: Does not bruise/bleed easily.   Psychiatric/Behavioral: Negative for depression.   All other systems reviewed and are negative.       Objective:   /70   Pulse 95   Ht 1.753 m (5' 9\")   Wt 95.3 kg (210 lb)   SpO2 95%   BMI 31.01 kg/m²      Physical Exam   Constitutional: He is oriented to person, place, and time. No distress.   HENT:   Head: Normocephalic and atraumatic.   Eyes: Conjunctivae are normal.   Neck: Normal range of motion. Neck supple. No JVD present.   Cardiovascular: Normal rate, regular rhythm and normal heart sounds.  Exam reveals no gallop and no friction rub.    No murmur heard.  Pulmonary/Chest: Effort normal and breath sounds normal. No respiratory " distress. He has no wheezes. He has no rales.   Abdominal: Soft. There is no tenderness.   Musculoskeletal: He exhibits no edema.   Neurological: He is alert and oriented to person, place, and time.   Skin: Skin is warm and dry. He is not diaphoretic.   Psychiatric: He has a normal mood and affect.   Nursing note and vitals reviewed.    Echocardiogram in April 2017 showed mild concentric LVH. EF 70%.  Mild RVH with mild reduction in RV function. RVSP 34 mmHg.    Labs performed in June 2018 were reviewed and showed hemoglobin 12.0.  Creatinine 1.2.    Assessment:     1. Essential hypertension     2. Other hyperlipidemia  LIPID PROFILE   3. GALE (obstructive sleep apnea)     4. Tachycardia  HOLTER MONITOR STUDY       Medical Decision Making:  Today's Assessment / Status / Plan:     Tachycardia: New issue for the patient.  His heart rate in our clinic has been in the 90s.  Normal on exam as well.  He will be referred for a 24-hour Holter monitor to evaluate for any arrhythmias/tachycardia.  No changes in medications for now.    Hypertension: Blood pressure is at goal.  Continue lisinopril and amlodipine at current dose.  Renal function has been normal.    Hyperlipidemia: Lipid panel ordered today.  Continue lovastatin at current dose.    Obstructive sleep apnea: Patient has been compliant with his CPAP.  Encouraged ongoing compliance.    Return to clinic in 6 months or earlier if needed.    Thank you for allowing me to participate in the care of this patient. Please do not hesitate to contact me with any questions.    Anitra Ellis MD  Cardiologist  [facility]      PLEASE NOTE: This dictation was created using voice recognition software.           Althea Jordan M.D.  [address] [facility] [address]  VIA Facsimile: [phone]

## 2018-09-11 NOTE — PROGRESS NOTES
Subjective:   Feliciano Rojas is a 83 y.o. male presenting to clinic for follow-up on hypertension.    Pertinent history:  Hypertension  Hyperlipidemia  Obstructive sleep apnea on CPAP    His main concern today is tachycardia that he has noted at home.  His heart rate is almost always in the 100s or higher when he checks it at home.  He denies any associated palpitations or dizziness.    His blood pressures are usually well controlled, like today.    He has obstructive sleep apnea and has been compliant with his CPAP.  Does not like using it however as he does not care much for the mask.    He is currently on lovastatin for his hyperlipidemia which is tolerating well.      Past Medical History:   Diagnosis Date   • BPH (benign prostatic hyperplasia)    • Cancer (HCC)     prostate ca   • High cholesterol    • Hypertension    • Indigestion    • Pneumonia    • Vertigo      Past Surgical History:   Procedure Laterality Date   • CARPAL TUNNEL RELEASE Right 12/17/2015    Procedure: CARPAL TUNNEL RELEASE;  Surgeon: Jeromy Santana D.O.;  Location: SURGERY [facility];  Service:    • APPENDECTOMY      • CATARACT EXTRACTION WITH IOL Bilateral    • PROSTATE CANCER BIOMARKER      seed implants prostate     History reviewed. No pertinent family history.  History   Smoking Status   • Former Smoker   • Packs/day: 2.00   • Years: 15.00   • Types: Cigarettes   • Quit date: 12/16/1970   Smokeless Tobacco   • Never Used     Occasionally alcohol use. No recreational drug use.    No Known Allergies  Outpatient Encounter Prescriptions as of 9/11/2018   Medication Sig Dispense Refill   • lovastatin (MEVACOR) 20 MG Tab Take 20 mg by mouth every evening.     • finasteride (PROSCAR) 5 MG Tab      • ascorbic acid (ASCORBIC ACID) 500 MG Tab Take 500 mg by mouth every day.     • amLODIPine (NORVASC) 5 MG Tab Take 1 Tab by mouth every day. 90 Tab 3   • lisinopril (PRINIVIL, ZESTRIL) 40 MG tablet Take 1 Tab by mouth every day. 90 Tab 3  "  • duloxetine (CYMBALTA) 30 MG Cap DR Particles Take 30 mg by mouth every day.     • REQUIP 4 MG tablet      • hydrocortisone (WESTCORT) 0.2 % cream      • triamcinolone acetonide (KENALOG) 0.1 % Cream      • loratadine (CLARITIN) 10 MG Tab      • terazosin (HYTRIN) 10 MG capsule Take 10 mg by mouth 2 times a day.     • famotidine (PEPCID) 20 MG TABS Take 20 mg by mouth 2 times a day.     • aspirin EC (ECOTRIN) 81 MG TBEC Take 81 mg by mouth every day.     • [DISCONTINUED] SIMVASTATIN PO Take  by mouth.       No facility-administered encounter medications on file as of 9/11/2018.      Review of Systems   Constitutional: Negative for malaise/fatigue.   Respiratory: Negative for shortness of breath.    Cardiovascular: Negative for chest pain, palpitations, orthopnea, leg swelling and PND.   Gastrointestinal: Negative for abdominal pain.   Musculoskeletal: Negative for falls.   Neurological: Negative for dizziness and loss of consciousness.   Endo/Heme/Allergies: Does not bruise/bleed easily.   Psychiatric/Behavioral: Negative for depression.   All other systems reviewed and are negative.       Objective:   /70   Pulse 95   Ht 1.753 m (5' 9\")   Wt 95.3 kg (210 lb)   SpO2 95%   BMI 31.01 kg/m²     Physical Exam   Constitutional: He is oriented to person, place, and time. No distress.   HENT:   Head: Normocephalic and atraumatic.   Eyes: Conjunctivae are normal.   Neck: Normal range of motion. Neck supple. No JVD present.   Cardiovascular: Normal rate, regular rhythm and normal heart sounds.  Exam reveals no gallop and no friction rub.    No murmur heard.  Pulmonary/Chest: Effort normal and breath sounds normal. No respiratory distress. He has no wheezes. He has no rales.   Abdominal: Soft. There is no tenderness.   Musculoskeletal: He exhibits no edema.   Neurological: He is alert and oriented to person, place, and time.   Skin: Skin is warm and dry. He is not diaphoretic.   Psychiatric: He has a normal mood " and affect.   Nursing note and vitals reviewed.    Echocardiogram in April 2017 showed mild concentric LVH. EF 70%.  Mild RVH with mild reduction in RV function. RVSP 34 mmHg.    Labs performed in June 2018 were reviewed and showed hemoglobin 12.0.  Creatinine 1.2.    Assessment:     1. Essential hypertension     2. Other hyperlipidemia  LIPID PROFILE   3. GALE (obstructive sleep apnea)     4. Tachycardia  HOLTER MONITOR STUDY       Medical Decision Making:  Today's Assessment / Status / Plan:     Tachycardia: New issue for the patient.  His heart rate in our clinic has been in the 90s.  Normal on exam as well.  He will be referred for a 24-hour Holter monitor to evaluate for any arrhythmias/tachycardia.  No changes in medications for now.    Hypertension: Blood pressure is at goal.  Continue lisinopril and amlodipine at current dose.  Renal function has been normal.    Hyperlipidemia: Lipid panel ordered today.  Continue lovastatin at current dose.    Obstructive sleep apnea: Patient has been compliant with his CPAP.  Encouraged ongoing compliance.    Return to clinic in 6 months or earlier if needed.    Thank you for allowing me to participate in the care of this patient. Please do not hesitate to contact me with any questions.    Anitra Ellis MD  Cardiologist  [facility]      PLEASE NOTE: This dictation was created using voice recognition software.

## 2018-09-28 ENCOUNTER — NON-PROVIDER VISIT (OUTPATIENT)
Dept: CARDIOLOGY | Facility: CLINIC | Age: 83
End: 2018-09-28
Payer: MEDICARE

## 2018-09-28 DIAGNOSIS — R00.0 TACHYCARDIA: ICD-10-CM

## 2018-10-04 LAB — EKG IMPRESSION: NORMAL

## 2018-10-04 PROCEDURE — 93224 XTRNL ECG REC UP TO 48 HRS: CPT | Performed by: INTERNAL MEDICINE

## 2018-10-12 ENCOUNTER — TELEPHONE (OUTPATIENT)
Dept: CARDIOLOGY | Facility: MEDICAL CENTER | Age: 83
End: 2018-10-12

## 2018-10-13 NOTE — TELEPHONE ENCOUNTER
Notes recorded by Anitra Ellis M.D. on 10/11/2018 at 11:47 AM PDT  Holter reviewed. No significant arrhythmias.  HR slightly on the higher side. If starts having worsening symptoms, he should let us know.   No change in plan at this time.  Follow-up as previously discussed.  Thank you  AA  ------

## 2018-10-17 NOTE — TELEPHONE ENCOUNTER
"Called pt to follow up on holter results. Information discussed in detail and questions answered. Pt notes that he is \"feeling fine\". Reassured him to follow up if any issues. He states understanding and will FU as planned.   "

## 2018-11-30 NOTE — OR NURSING
Talked to Virgen at Dr. Vera. Requested BMP, CBC, [place] [facility]

## 2018-12-03 ENCOUNTER — APPOINTMENT (OUTPATIENT)
Dept: ADMISSIONS | Facility: MEDICAL CENTER | Age: 83
End: 2018-12-03
Attending: INTERNAL MEDICINE
Payer: MEDICARE

## 2018-12-03 RX ORDER — GABAPENTIN 300 MG/1
300 CAPSULE ORAL 3 TIMES DAILY
COMMUNITY
End: 2019-01-24

## 2018-12-03 RX ORDER — TERAZOSIN 5 MG/1
5 CAPSULE ORAL EVERY MORNING
COMMUNITY
End: 2019-01-24

## 2018-12-03 NOTE — OR NURSING
Preadmit appt:  Patient instructed to continue regularly prescribed medications through day before surgery.  Instructed to take the following medications the day of surgery with a sip of water per anesthesia protocol: amlodipine. Pt states he has stopped aspirin. Pt uses cpap at night.

## 2018-12-05 ENCOUNTER — HOSPITAL ENCOUNTER (OUTPATIENT)
Facility: MEDICAL CENTER | Age: 83
End: 2018-12-05
Attending: INTERNAL MEDICINE | Admitting: INTERNAL MEDICINE
Payer: MEDICARE

## 2018-12-05 VITALS
WEIGHT: 206.57 LBS | HEART RATE: 90 BPM | SYSTOLIC BLOOD PRESSURE: 153 MMHG | TEMPERATURE: 97 F | DIASTOLIC BLOOD PRESSURE: 83 MMHG | RESPIRATION RATE: 14 BRPM | HEIGHT: 69 IN | OXYGEN SATURATION: 94 % | BODY MASS INDEX: 30.6 KG/M2

## 2018-12-05 LAB — PATHOLOGY CONSULT NOTE: NORMAL

## 2018-12-05 PROCEDURE — 500066 HCHG BITE BLOCK, ECT: Performed by: INTERNAL MEDICINE

## 2018-12-05 PROCEDURE — 700101 HCHG RX REV CODE 250

## 2018-12-05 PROCEDURE — 160046 HCHG PACU - 1ST 60 MINS PHASE II: Performed by: INTERNAL MEDICINE

## 2018-12-05 PROCEDURE — 503369 HCHG GOLDPROBE, 7 FR: Performed by: INTERNAL MEDICINE

## 2018-12-05 PROCEDURE — 700111 HCHG RX REV CODE 636 W/ 250 OVERRIDE (IP)

## 2018-12-05 PROCEDURE — 160025 RECOVERY II MINUTES (STATS): Performed by: INTERNAL MEDICINE

## 2018-12-05 PROCEDURE — 88305 TISSUE EXAM BY PATHOLOGIST: CPT

## 2018-12-05 PROCEDURE — 160002 HCHG RECOVERY MINUTES (STAT): Performed by: INTERNAL MEDICINE

## 2018-12-05 PROCEDURE — 160035 HCHG PACU - 1ST 60 MINS PHASE I: Performed by: INTERNAL MEDICINE

## 2018-12-05 PROCEDURE — 160203 HCHG ENDO MINUTES - 1ST 30 MINS LEVEL 4: Performed by: INTERNAL MEDICINE

## 2018-12-05 PROCEDURE — 501629 HCHG TUBE, LUKI TRAP STERILE DISP: Performed by: INTERNAL MEDICINE

## 2018-12-05 PROCEDURE — 160036 HCHG PACU - EA ADDL 30 MINS PHASE I: Performed by: INTERNAL MEDICINE

## 2018-12-05 PROCEDURE — 160009 HCHG ANES TIME/MIN: Performed by: INTERNAL MEDICINE

## 2018-12-05 PROCEDURE — 160208 HCHG ENDO MINUTES - EA ADDL 1 MIN LEVEL 4: Performed by: INTERNAL MEDICINE

## 2018-12-05 PROCEDURE — 160048 HCHG OR STATISTICAL LEVEL 1-5: Performed by: INTERNAL MEDICINE

## 2018-12-05 RX ORDER — HYDROMORPHONE HYDROCHLORIDE 2 MG/ML
0.1 INJECTION, SOLUTION INTRAMUSCULAR; INTRAVENOUS; SUBCUTANEOUS
Status: DISCONTINUED | OUTPATIENT
Start: 2018-12-05 | End: 2018-12-05 | Stop reason: HOSPADM

## 2018-12-05 RX ORDER — HALOPERIDOL 5 MG/ML
1 INJECTION INTRAMUSCULAR
Status: DISCONTINUED | OUTPATIENT
Start: 2018-12-05 | End: 2018-12-05 | Stop reason: HOSPADM

## 2018-12-05 RX ORDER — OXYCODONE HYDROCHLORIDE 5 MG/1
5 TABLET ORAL
Status: DISCONTINUED | OUTPATIENT
Start: 2018-12-05 | End: 2018-12-05 | Stop reason: HOSPADM

## 2018-12-05 RX ORDER — HYDROMORPHONE HYDROCHLORIDE 2 MG/ML
0.2 INJECTION, SOLUTION INTRAMUSCULAR; INTRAVENOUS; SUBCUTANEOUS
Status: DISCONTINUED | OUTPATIENT
Start: 2018-12-05 | End: 2018-12-05 | Stop reason: HOSPADM

## 2018-12-05 RX ORDER — SODIUM CHLORIDE, SODIUM LACTATE, POTASSIUM CHLORIDE, CALCIUM CHLORIDE 600; 310; 30; 20 MG/100ML; MG/100ML; MG/100ML; MG/100ML
INJECTION, SOLUTION INTRAVENOUS CONTINUOUS
Status: DISCONTINUED | OUTPATIENT
Start: 2018-12-05 | End: 2018-12-05 | Stop reason: HOSPADM

## 2018-12-05 RX ORDER — SODIUM CHLORIDE, SODIUM LACTATE, POTASSIUM CHLORIDE, CALCIUM CHLORIDE 600; 310; 30; 20 MG/100ML; MG/100ML; MG/100ML; MG/100ML
1000 INJECTION, SOLUTION INTRAVENOUS
Status: COMPLETED | OUTPATIENT
Start: 2018-12-05 | End: 2018-12-05

## 2018-12-05 RX ORDER — MEPERIDINE HYDROCHLORIDE 25 MG/ML
12.5 INJECTION INTRAMUSCULAR; INTRAVENOUS; SUBCUTANEOUS
Status: DISCONTINUED | OUTPATIENT
Start: 2018-12-05 | End: 2018-12-05 | Stop reason: HOSPADM

## 2018-12-05 RX ORDER — OXYCODONE HYDROCHLORIDE 10 MG/1
10 TABLET ORAL
Status: DISCONTINUED | OUTPATIENT
Start: 2018-12-05 | End: 2018-12-05 | Stop reason: HOSPADM

## 2018-12-05 RX ORDER — HYDROMORPHONE HYDROCHLORIDE 2 MG/ML
0.4 INJECTION, SOLUTION INTRAMUSCULAR; INTRAVENOUS; SUBCUTANEOUS
Status: DISCONTINUED | OUTPATIENT
Start: 2018-12-05 | End: 2018-12-05 | Stop reason: HOSPADM

## 2018-12-05 RX ORDER — OXYCODONE HCL 5 MG/5 ML
5 SOLUTION, ORAL ORAL
Status: DISCONTINUED | OUTPATIENT
Start: 2018-12-05 | End: 2018-12-05 | Stop reason: HOSPADM

## 2018-12-05 RX ORDER — DIPHENHYDRAMINE HYDROCHLORIDE 50 MG/ML
12.5 INJECTION INTRAMUSCULAR; INTRAVENOUS
Status: DISCONTINUED | OUTPATIENT
Start: 2018-12-05 | End: 2018-12-05 | Stop reason: HOSPADM

## 2018-12-05 RX ORDER — OXYCODONE HCL 5 MG/5 ML
10 SOLUTION, ORAL ORAL
Status: DISCONTINUED | OUTPATIENT
Start: 2018-12-05 | End: 2018-12-05 | Stop reason: HOSPADM

## 2018-12-05 RX ORDER — ONDANSETRON 2 MG/ML
4 INJECTION INTRAMUSCULAR; INTRAVENOUS
Status: DISCONTINUED | OUTPATIENT
Start: 2018-12-05 | End: 2018-12-05 | Stop reason: HOSPADM

## 2018-12-05 RX ADMIN — SODIUM CHLORIDE, SODIUM LACTATE, POTASSIUM CHLORIDE, CALCIUM CHLORIDE 1000 ML: 600; 310; 30; 20 INJECTION, SOLUTION INTRAVENOUS at 12:15

## 2018-12-05 ASSESSMENT — PAIN SCALES - GENERAL
PAINLEVEL_OUTOF10: 0

## 2018-12-05 NOTE — OR NURSING
1520: To PACU from EUS via gurney, sleeping, respirations spontaneous and non-labored via OPA.  Abdo soft, + bowel sounds. IV rate increased per Dr Hernandes's request for low BP. Plan to keep pt in PACU for full hour per STOPBANG protocol. Pt's CPAP not available at this time.   1530: BP increasing  1545: Does not yet respond to verbal stimuli  1550: Rouses to name, denies pain and nausea, commenced po water, DB&C. Pt confirms he did not bring CPAP to facility today - educated on need to bring with him in future.  1600: O2 d/solomon  1610: Stable, tolerating po water and soda  1620: Meets criteria to transfer to Stage 2

## 2018-12-05 NOTE — PROGRESS NOTES
Indication:Duodenal lesion.   Risks, benefits, and alternatives were discussed with consenting person(s). Consenting person(s) were given an opportunity to ask questions and discuss other options. Risks including but not limited to failed or incomplete EUS, ineffective therapy, pancreatitis (with potential future complications), perforation, infection, bleeding, missed lesion(s), missed diagnosis, cardiac and/or pulmonary event, aspiration, stroke, possible need for surgery, hospitalization possibly prolonged, discomfort, unsuccessful and/or incomplete procedure, possible need for repeat procedures and/or additional testings, damage to adjacent organs and/or vascular structures, medication reaction, disability, death, and other adverse events possibly life-threatening. Discussion was undertaken with Layman's terms. Consenting persons stated understanding and acceptance of these risks, and wished to proceed. Consent was given in clear state of mind.

## 2018-12-05 NOTE — DISCHARGE INSTRUCTIONS
ENDOSCOPY HOME CARE INSTRUCTIONS    GASTROSCOPY OR ERCP  1. Don't eat or drink anything for about an hour after the test. You can then resume your regular diet.  2. Don't drive or drink alcohol for 24 hours. The medication you received will make you too drowsy.  3. Don't take any coffee, tea, or aspirin products until after you see your doctor. These can harm the lining of your stomach.  4. If you begin to vomit bloody material, or develop black or bloody stools, call your doctor as soon as possible.  5. If you have any neck, chest, abdominal pain or temp of 100 degrees, call your doctor.  6. See your doctor call to schedule    You should call 911 if you develop problems with breathing or chest pain.  If any questions arise, call your doctor. If your doctor is not available, please feel free to call [phone]. You can also call the HEALTH HOTLINE open 24 hours/day, 7 days/week and speak to a nurse at [phone], or toll free [phone].    Depression / Suicide Risk    As you are discharged from this [facility] facility, it is important to learn how to keep safe from harming yourself.    Recognize the warning signs:  · Abrupt changes in personality, positive or negative- including increase in energy   · Giving away possessions  · Change in eating patterns- significant weight changes-  positive or negative  · Change in sleeping patterns- unable to sleep or sleeping all the time   · Unwillingness or inability to communicate  · Depression  · Unusual sadness, discouragement and loneliness  · Talk of wanting to die  · Neglect of personal appearance   · Rebelliousness- reckless behavior  · Withdrawal from people/activities they love  · Confusion- inability to concentrate     If you or a loved one observes any of these behaviors or has concerns about self-harm, here's what you can do:  · Talk about it- your feelings and reasons for harming yourself  · Remove any means that you might use to hurt yourself  (examples: pills, rope, extension cords, firearm)  · Get professional help from the community (Mental Health, Substance Abuse, psychological counseling)  · Do not be alone:Call your Safe Contact- someone whom you trust who will be there for you.  · Call your local CRISIS HOTLINE 175-7383 or 004-965-2526  · Call your local Children's Mobile Crisis Response Team [place] (087) 690-8936 or www.[employer]  · Call the toll free National Suicide Prevention Hotlines   · National Suicide Prevention Lifeline 748-421-SEVX (1659)  · National Hope Line Network 800-SUICIDE (163-9404)    I acknowledge receipt and understanding of these Home Care Instructions.    Discharge Education for patients on GALE (Obstructive Sleep Apnea) Protocol    Prior to receiving sedation or anesthesia, we screen all patients for Obstructive Sleep Apnea.  During your screening, you were identified as having Obstructive Sleep Apnea(GALE).    What is Obstructive Sleep Apnea?  Sleep apnea (AP-ne-ah) is a common disorder which involves breathing pauses that occur during sleep.  These can last from 10 seconds to a minute or longer.  Normal breathing resumes often with a loud snort or choking sound.    Sleep apnea occurs in all age groups and both genders but is more common in men and people over 40 years of age.  It has been estimated that as many as 18 million Americans have sleep apnea.  Most people who have sleep apnea don’t know they have it because it only occurs during sleep.  A family member and/or bed partner may first notice the signs of sleep apnea.  Sleep apnea is a chronic (ongoing) condition that disrupts the quality and quantity of your sleep repeatedly throughout the night.  This often results in excessive daytime sleepiness or fatigue during the day.  It may also contribute to high blood pressure, heart problems, and complications following medications used for surgery and procedures.      We recommend that you should be with an  adult observer for at least 24 hours after your sedation/anesthesia.  If you have a CPAP machine, you should wear it during any sleep period (day or night) for the week following your procedure.  We encourage you to sleep on your side or in a sitting position, even with napping.  Lying flat on your back increases the risk of apnea and airway obstruction during your post procedure recovery period.    It is important to prevent over-sedation that could increase your risk for apnea.  Please take all pain medication as directed by your physician.  If you are not getting pain relief, please contact your physician to discuss possible approaches to relieving pain while minimizing medications that can affect your breathing and oxygen levels.

## 2018-12-05 NOTE — PROCEDURES
Echoendoscope  Date of Procedure: 12/5/2018  Attending Physician: Terell Vera MD    Indications: Submucosal lesion in the duodenum  Instrument: Olympus Echoendoscope  Sedation: Anesthesiologist/Type of Anesthesia:  Anesthesiologist: Ken Hernandes M.D./General    Surgical Staff:  Circulator: Jermaine Easley R.N.  Endoscopy Technician: Jeannie Saucedo; Lian Lebron    Specimens removed if any:  ID Type Source Tests Collected by Time Destination   1 : duodenal lesion bx Tissue Duodenum ROUTINE/HISTOLOGY Terell Vera M.D. 12/5/2018  3:06 PM        Pre-Anesthesia Assessment:  Prior to the procedure, a History and Physical was performed, and patient medications and allergies were reviewed. The patient’s tolerance of previous anesthesia was also reviewed. The risks and benefits of the procedure and the sedation options and risks were discussed with the patient including but not limited to infection, bleeding, aspiration, perforation, adverse medication reaction, missed diagnosis,  pancreatitis, and missed lesions. The patient verbalized understanding. All questions were answered, and informed consent was obtained.     After I obtained informed consent from the patient, the patient was placed in the left lateral position. Appropriate time-out protocol was followed: the correct patient, the correct procedure, and the correct equipment in the room were confirmed. Throughout the procedure, the patient’s blood pressure, pulse, and oxygen saturations were monitored continuously. The echoendoscope was inserted through the oropharynx, esophagus intubated, then advanced to the gastrointestinal tract.  Findings and interventions were performed and documented below. Air was then withdrawn and the echoendoscope was removed. The patient tolerated the procedure well. There were no immediate postoperative complications.     Findings:    EGD:  Esophagus: normal GE junction at 40 cm no gross abnormality of the esophagus no  stricture    Gastric mild gastric erythema no obvious ulceration    Duodenum duodenal bulb with erosion and erythema.  Previously biopsied no additional biopsy performed.  Second portion duodenum ampulla not readily visible.  In the second portion duodenum there was a possible submucosal lesion approximately with with yellow hue underneath the mucosa suspicious for possible lipoma.  After endoscopic ultrasound was completed tunnel well biopsy was performed with suggestion of lipomatous features.    EUS  Endoscopic exam with the side viewing echoendoscope was normal. The major papilla was normal  sonographically.    The bile duct was non-dilated and measured 4 mm in maximal diameter.There were no stones or sludge.    The pancreatic parenchyma appeared normal. There were no features of chronic pancreatitis. No masses, cysts or stones were visualized in the pancreatic parenchyma. The pancreatic duct measured 4 mm in the head, 3mm in the body of the pancreas.    No lymph nodes were seen in the upper abdomen and mediastinum.    No masses were seen in the visualized portions of the liver.    In the 2nd portion of duodenum, the submucosal lesion was visualized. Measured 15mm x 6mm. It was well circumscribed with hypoechoic features in the submucosal layer, most consistent with lipoma.    Impressions:   1. Submucosal lesion in the 2nd portion of dudenum; on EUS with hypoechoic well circumscribed features in the submucosal layer most consistent with lipoma  2. Tunneled biopsy of the submucosal lesion with yellowish pigment suggests of lipoma  3. Gastric erythema and duodenal erythema    Recommendations:   1. Await pathology  2. Follow-up with Dr. Gallagher.

## 2019-04-05 ENCOUNTER — OFFICE VISIT (OUTPATIENT)
Dept: CARDIOLOGY | Facility: CLINIC | Age: 84
End: 2019-04-05
Payer: MEDICARE

## 2019-04-05 VITALS
OXYGEN SATURATION: 92 % | DIASTOLIC BLOOD PRESSURE: 70 MMHG | HEART RATE: 96 BPM | SYSTOLIC BLOOD PRESSURE: 122 MMHG | WEIGHT: 207 LBS | HEIGHT: 69 IN | BODY MASS INDEX: 30.66 KG/M2

## 2019-04-05 DIAGNOSIS — N28.9 RENAL INSUFFICIENCY: ICD-10-CM

## 2019-04-05 DIAGNOSIS — I10 ESSENTIAL HYPERTENSION: ICD-10-CM

## 2019-04-05 DIAGNOSIS — R00.2 PALPITATIONS: ICD-10-CM

## 2019-04-05 DIAGNOSIS — E78.49 OTHER HYPERLIPIDEMIA: ICD-10-CM

## 2019-04-05 DIAGNOSIS — Z79.899 ENCOUNTER FOR LONG-TERM (CURRENT) USE OF HIGH-RISK MEDICATION: ICD-10-CM

## 2019-04-05 PROCEDURE — 99215 OFFICE O/P EST HI 40 MIN: CPT | Performed by: INTERNAL MEDICINE

## 2019-04-05 RX ORDER — TERAZOSIN 5 MG/1
5 CAPSULE ORAL EVERY EVENING
COMMUNITY

## 2019-04-05 ASSESSMENT — ENCOUNTER SYMPTOMS
PND: 0
COUGH: 1
PALPITATIONS: 0
DIZZINESS: 0
ORTHOPNEA: 0
DEPRESSION: 0
FALLS: 0
ABDOMINAL PAIN: 0
LOSS OF CONSCIOUSNESS: 0
SHORTNESS OF BREATH: 0

## 2019-04-05 NOTE — Clinical Note
Can you please call this patient and asked him to get his Chem-7 checked?  I just ordered it today. By the time I realized that he needs a Chem-7 my staff at [facility] had already left. Thanks TL

## 2019-04-05 NOTE — LETTER
[facility] [address],   [address] [place] [address]  Phone: [phone]  Fax: [phone]              Feliciano Rojas  1935    Encounter Date: 4/5/2019    Anitra Ellis M.D.          PROGRESS NOTE:  Subjective:   Feliciano Rojas is a 83-year-old male presenting to clinic for follow-up on hypertension.    Pertinent history:  Hypertension  Hyperlipidemia  Obstructive sleep apnea on CPAP    Patient has been doing well overall since his last appointment.  His blood pressure is usually in the 130s systolic.  He was recently diagnosed with a DVT and is currently on Xarelto for this.  He is currently off of the aspirin because of the Xarelto.  Wants to know if even needs the aspirin in the first place.  For his hyperlipidemia he continues to be on lovastatin which he is tolerating well.  Since his last visit, he has not any palpitations.    Past Medical History:   Diagnosis Date   • Arthritis     lower back   • BPH (benign prostatic hyperplasia)    • Cancer (HCC) 2001    prostate ca; treated with seed implants   • Cataract 2014    bilateral IOLI   • High cholesterol    • Hypertension    • Indigestion    • Pneumonia 2010   • Sleep apnea     CPAP at night    • Vertigo      Past Surgical History:   Procedure Laterality Date   • GASTROSCOPY  12/5/2018    Procedure: GASTROSCOPY;  Surgeon: Terell Vera M.D.;  Location: [facility];  Service: EUS   • EGD W/ENDOSCOPIC ULTRASOUND  12/5/2018    Procedure: EGD W/ENDOSCOPIC ULTRASOUND;  Surgeon: Terell Vera M.D.;  Location: [facility];  Service: EUS   • EGD WITH ASP/BX  12/5/2018    Procedure: EGD WITH ASP/BX - FNA OF DUODENAL LESION;  Surgeon: Terell Vera M.D.;  Location: [facility];  Service: EUS   • OTHER ABDOMINAL SURGERY  2018    umbilical hernia repair   • CARPAL TUNNEL RELEASE Right 12/17/2015    Procedure: CARPAL TUNNEL RELEASE;  Surgeon: Jeromy SHAVER  ROCK Santana;  Location: SURGERY [facility];  Service:    • APPENDECTOMY      • CATARACT EXTRACTION WITH IOL Bilateral    • PROSTATE CANCER BIOMARKER      seed implants prostate     History reviewed. No pertinent family history.  History   Smoking Status   • Former Smoker   • Packs/day: 2.00   • Years: 15.00   • Types: Cigarettes   • Quit date: 1/1/1966   Smokeless Tobacco   • Never Used     Occasionally alcohol use. No recreational drug use.    No Known Allergies  Outpatient Encounter Prescriptions as of 4/5/2019   Medication Sig Dispense Refill   • terazosin (HYTRIN) 5 MG Cap 1 cap(s)     • rivaroxaban (XARELTO) 10 MG Tab tablet Take 10 mg by mouth.     • lovastatin (MEVACOR) 20 MG Tab Take 20 mg by mouth every evening.     • ascorbic acid (ASCORBIC ACID) 500 MG Tab Take 500 mg by mouth every day.     • amLODIPine (NORVASC) 5 MG Tab Take 1 Tab by mouth every day. 90 Tab 3   • lisinopril (PRINIVIL, ZESTRIL) 40 MG tablet Take 1 Tab by mouth every day. 90 Tab 3   • duloxetine (CYMBALTA) 30 MG Cap DR Particles Take 30 mg by mouth every day.     • REQUIP 4 MG tablet      • loratadine (CLARITIN) 10 MG Tab      • terazosin (HYTRIN) 10 MG capsule Take 10 mg by mouth every bedtime.     • famotidine (PEPCID) 20 MG TABS Take 20 mg by mouth 2 times a day.     • [DISCONTINUED] aspirin EC (ECOTRIN) 81 MG TBEC Take 81 mg by mouth every day.       No facility-administered encounter medications on file as of 4/5/2019.      Review of Systems   Constitutional: Negative for malaise/fatigue.   Respiratory: Positive for cough. Negative for shortness of breath.    Cardiovascular: Negative for chest pain, palpitations, orthopnea, leg swelling and PND.   Gastrointestinal: Negative for abdominal pain.   Musculoskeletal: Negative for falls.   Neurological: Negative for dizziness and loss of consciousness.   Psychiatric/Behavioral: Negative for depression.   All other systems reviewed and are negative.       Objective:   /70  "(BP Location: Right arm, Patient Position: Sitting)   Pulse 96   Ht 1.753 m (5' 9\")   Wt 93.9 kg (207 lb)   SpO2 92%   BMI 30.57 kg/m²      Physical Exam   Constitutional: He is oriented to person, place, and time. He appears well-developed and well-nourished. No distress.   HENT:   Head: Normocephalic and atraumatic.   Eyes: Conjunctivae are normal.   Neck: Normal range of motion. Neck supple. No JVD present.   Cardiovascular: Normal rate, regular rhythm and normal heart sounds.  Exam reveals no gallop and no friction rub.    No murmur heard.  Pulmonary/Chest: Effort normal and breath sounds normal. No respiratory distress. He has no wheezes. He has no rales.   Abdominal: Soft. There is no tenderness.   Musculoskeletal: He exhibits no edema.   Neurological: He is alert and oriented to person, place, and time.   Skin: Skin is warm and dry. He is not diaphoretic.   Psychiatric: He has a normal mood and affect.   Nursing note and vitals reviewed.    Echocardiogram in April 2017 showed mild concentric LVH. EF 70%.  Mild RVH with mild reduction in RV function. RVSP 34 mmHg.    Holter monitor performed in September 2018 was reviewed and showed sinus rhythm with an average heart rate of 92 bpm.  No arrhythmias noted.    Labs performed in January 2019 were reviewed and showed creatinine 1.5, slightly higher than before.    Assessment:     1. Essential hypertension     2. Other hyperlipidemia     3. Palpitations     4. Encounter for long-term (current) use of high-risk medication     5. Renal insufficiency  Basic Metabolic Panel       Medical Decision Making:  Today's Assessment / Status / Plan:     Hypertension:  Renal insufficiency: New issue.  Blood pressure is at goal.  Continue current medication regimen including lisinopril.  His last renal function was slightly worse compared to his baseline of 1.0-1.3.  A repeat Chem-7 will be ordered today to reevaluate his renal function.    Hyperlipidemia: Continue " lovastatin at current dose.    Palpitations: Patient has not had any recurrent palpitations.  His Holter monitor was unremarkable as noted above.  Continue to monitor for recurrent symptoms.    Return to clinic in 6 months or earlier if needed.    Thank you for allowing me to participate in the care of this patient. Please do not hesitate to contact me with any questions.    Anitra Ellis MD  Cardiologist  [facility]      PLEASE NOTE: This dictation was created using voice recognition software.           Althea Jordan M.D.  [address] [facility] [address]  VIA Facsimile: [phone]

## 2019-04-05 NOTE — PROGRESS NOTES
Subjective:   Feliciano Rojas is a 83-year-old male presenting to clinic for follow-up on hypertension.    Pertinent history:  Hypertension  Hyperlipidemia  Obstructive sleep apnea on CPAP    Patient has been doing well overall since his last appointment.  His blood pressure is usually in the 130s systolic.  He was recently diagnosed with a DVT and is currently on Xarelto for this.  He is currently off of the aspirin because of the Xarelto.  Wants to know if even needs the aspirin in the first place.  For his hyperlipidemia he continues to be on lovastatin which he is tolerating well.  Since his last visit, he has not any palpitations.    Past Medical History:   Diagnosis Date   • Arthritis     lower back   • BPH (benign prostatic hyperplasia)    • Cancer (HCC) 2001    prostate ca; treated with seed implants   • Cataract 2014    bilateral IOLI   • High cholesterol    • Hypertension    • Indigestion    • Pneumonia 2010   • Sleep apnea     CPAP at night    • Vertigo      Past Surgical History:   Procedure Laterality Date   • GASTROSCOPY  12/5/2018    Procedure: GASTROSCOPY;  Surgeon: Terell Vera M.D.;  Location: [facility];  Service: EUS   • EGD W/ENDOSCOPIC ULTRASOUND  12/5/2018    Procedure: EGD W/ENDOSCOPIC ULTRASOUND;  Surgeon: Terell Vera M.D.;  Location: [facility];  Service: EUS   • EGD WITH ASP/BX  12/5/2018    Procedure: EGD WITH ASP/BX - FNA OF DUODENAL LESION;  Surgeon: Terell Vera M.D.;  Location: [facility];  Service: EUS   • OTHER ABDOMINAL SURGERY  2018    umbilical hernia repair   • CARPAL TUNNEL RELEASE Right 12/17/2015    Procedure: CARPAL TUNNEL RELEASE;  Surgeon: Jeromy Santana D.O.;  Location: [facility];  Service:    • APPENDECTOMY      • CATARACT EXTRACTION WITH IOL Bilateral    • PROSTATE CANCER BIOMARKER      seed implants prostate     History reviewed. No pertinent family history.  History   Smoking Status   • Former Smoker   •  "Packs/day: 2.00   • Years: 15.00   • Types: Cigarettes   • Quit date: 1/1/1966   Smokeless Tobacco   • Never Used     Occasionally alcohol use. No recreational drug use.    No Known Allergies  Outpatient Encounter Prescriptions as of 4/5/2019   Medication Sig Dispense Refill   • terazosin (HYTRIN) 5 MG Cap 1 cap(s)     • rivaroxaban (XARELTO) 10 MG Tab tablet Take 10 mg by mouth.     • lovastatin (MEVACOR) 20 MG Tab Take 20 mg by mouth every evening.     • ascorbic acid (ASCORBIC ACID) 500 MG Tab Take 500 mg by mouth every day.     • amLODIPine (NORVASC) 5 MG Tab Take 1 Tab by mouth every day. 90 Tab 3   • lisinopril (PRINIVIL, ZESTRIL) 40 MG tablet Take 1 Tab by mouth every day. 90 Tab 3   • duloxetine (CYMBALTA) 30 MG Cap DR Particles Take 30 mg by mouth every day.     • REQUIP 4 MG tablet      • loratadine (CLARITIN) 10 MG Tab      • terazosin (HYTRIN) 10 MG capsule Take 10 mg by mouth every bedtime.     • famotidine (PEPCID) 20 MG TABS Take 20 mg by mouth 2 times a day.     • [DISCONTINUED] aspirin EC (ECOTRIN) 81 MG TBEC Take 81 mg by mouth every day.       No facility-administered encounter medications on file as of 4/5/2019.      Review of Systems   Constitutional: Negative for malaise/fatigue.   Respiratory: Positive for cough. Negative for shortness of breath.    Cardiovascular: Negative for chest pain, palpitations, orthopnea, leg swelling and PND.   Gastrointestinal: Negative for abdominal pain.   Musculoskeletal: Negative for falls.   Neurological: Negative for dizziness and loss of consciousness.   Psychiatric/Behavioral: Negative for depression.   All other systems reviewed and are negative.       Objective:   /70 (BP Location: Right arm, Patient Position: Sitting)   Pulse 96   Ht 1.753 m (5' 9\")   Wt 93.9 kg (207 lb)   SpO2 92%   BMI 30.57 kg/m²     Physical Exam   Constitutional: He is oriented to person, place, and time. He appears well-developed and well-nourished. No distress.   HENT: "   Head: Normocephalic and atraumatic.   Eyes: Conjunctivae are normal.   Neck: Normal range of motion. Neck supple. No JVD present.   Cardiovascular: Normal rate, regular rhythm and normal heart sounds.  Exam reveals no gallop and no friction rub.    No murmur heard.  Pulmonary/Chest: Effort normal and breath sounds normal. No respiratory distress. He has no wheezes. He has no rales.   Abdominal: Soft. There is no tenderness.   Musculoskeletal: He exhibits no edema.   Neurological: He is alert and oriented to person, place, and time.   Skin: Skin is warm and dry. He is not diaphoretic.   Psychiatric: He has a normal mood and affect.   Nursing note and vitals reviewed.    Echocardiogram in April 2017 showed mild concentric LVH. EF 70%.  Mild RVH with mild reduction in RV function. RVSP 34 mmHg.    Holter monitor performed in September 2018 was reviewed and showed sinus rhythm with an average heart rate of 92 bpm.  No arrhythmias noted.    Labs performed in January 2019 were reviewed and showed creatinine 1.5, slightly higher than before.    Assessment:     1. Essential hypertension     2. Other hyperlipidemia     3. Palpitations     4. Encounter for long-term (current) use of high-risk medication     5. Renal insufficiency  Basic Metabolic Panel       Medical Decision Making:  Today's Assessment / Status / Plan:     Hypertension:  Renal insufficiency: New issue.  Blood pressure is at goal.  Continue current medication regimen including lisinopril.  His last renal function was slightly worse compared to his baseline of 1.0-1.3.  A repeat Chem-7 will be ordered today to reevaluate his renal function.    Hyperlipidemia: Continue lovastatin at current dose.    Palpitations: Patient has not had any recurrent palpitations.  His Holter monitor was unremarkable as noted above.  Continue to monitor for recurrent symptoms.    Return to clinic in 6 months or earlier if needed.    Thank you for allowing me to participate in the  care of this patient. Please do not hesitate to contact me with any questions.    Anitra Ellis MD  Cardiologist  [facility]      PLEASE NOTE: This dictation was created using voice recognition software.

## 2019-04-08 ENCOUNTER — TELEPHONE (OUTPATIENT)
Dept: CARDIOLOGY | Facility: MEDICAL CENTER | Age: 84
End: 2019-04-08

## 2019-04-08 NOTE — TELEPHONE ENCOUNTER
----- Message from Anitra Ellis M.D. sent at 4/5/2019  5:09 PM PDT -----  Can you please call this patient and asked him to get his Chem-7 checked?  I just ordered it today.  By the time I realized that he needs a Chem-7 my staff at [facility] had already left.  Thanks  AA

## 2019-04-08 NOTE — TELEPHONE ENCOUNTER
Called and informed pt. He states he just had one done through his PCP in January and does not want to have another one done at this time.

## 2019-04-16 DIAGNOSIS — I10 ESSENTIAL HYPERTENSION: ICD-10-CM

## 2019-04-17 RX ORDER — LISINOPRIL 40 MG/1
TABLET ORAL
Qty: 90 TAB | Refills: 3 | Status: SHIPPED | OUTPATIENT
Start: 2019-04-17 | End: 2020-04-13

## 2019-04-17 RX ORDER — AMLODIPINE BESYLATE 5 MG/1
TABLET ORAL
Qty: 90 TAB | Refills: 3 | Status: SHIPPED | OUTPATIENT
Start: 2019-04-17 | End: 2020-04-13

## 2020-01-14 ENCOUNTER — OFFICE VISIT (OUTPATIENT)
Dept: CARDIOLOGY | Facility: CLINIC | Age: 85
End: 2020-01-14
Payer: MEDICARE

## 2020-01-14 VITALS
DIASTOLIC BLOOD PRESSURE: 70 MMHG | OXYGEN SATURATION: 92 % | BODY MASS INDEX: 30.81 KG/M2 | HEART RATE: 100 BPM | SYSTOLIC BLOOD PRESSURE: 130 MMHG | WEIGHT: 208 LBS | HEIGHT: 69 IN

## 2020-01-14 DIAGNOSIS — E78.49 OTHER HYPERLIPIDEMIA: ICD-10-CM

## 2020-01-14 DIAGNOSIS — I10 ESSENTIAL HYPERTENSION: ICD-10-CM

## 2020-01-14 DIAGNOSIS — G47.33 OSA (OBSTRUCTIVE SLEEP APNEA): ICD-10-CM

## 2020-01-14 DIAGNOSIS — Z79.899 ENCOUNTER FOR LONG-TERM (CURRENT) USE OF HIGH-RISK MEDICATION: ICD-10-CM

## 2020-01-14 PROBLEM — C90.00 MULTIPLE MYELOMA (HCC): Status: ACTIVE | Noted: 2020-01-14

## 2020-01-14 PROCEDURE — 99214 OFFICE O/P EST MOD 30 MIN: CPT | Performed by: INTERNAL MEDICINE

## 2020-01-14 ASSESSMENT — ENCOUNTER SYMPTOMS
LOSS OF CONSCIOUSNESS: 0
PALPITATIONS: 0
DEPRESSION: 0
ABDOMINAL PAIN: 0
SHORTNESS OF BREATH: 0
FALLS: 0
PND: 0
ORTHOPNEA: 0
DIZZINESS: 0

## 2020-01-14 NOTE — LETTER
[facility] [address],   [address]nd [place] [address]  Phone: [phone]  Fax: [phone]              Feliciano Rojas  1935    Encounter Date: 1/14/2020    Anitra Ellis M.D.          PROGRESS NOTE:  Chief Complaint   Patient presents with   • HTN (Controlled)       Subjective:   Feliciano Rojas is a 84 y.o. male who presents today to follow-up on hypertension.    Pertinent history:  Hypertension  Hyperlipidemia  Obstructive sleep apnea on CPAP      Patient has been doing well since his last appointment.  He is planning on moving to [place] to be closer to his daughter.  His blood pressures have been well controlled, like today.  For his hyperlipidemia he is on lovastatin which he is tolerating well.  He has been using his CPAP on a daily basis.  He has recently started walking more.  Denies any anginal symptoms with exercise.    He was diagnosed with a DVT and is currently on Xarelto.    Past Medical History:   Diagnosis Date   • Arthritis     lower back   • BPH (benign prostatic hyperplasia)    • Cancer (HCC) 2001    prostate ca; treated with seed implants   • Cataract 2014    bilateral IOLI   • High cholesterol    • Hypertension    • Indigestion    • Pneumonia 2010   • Sleep apnea     CPAP at night    • Vertigo      Past Surgical History:   Procedure Laterality Date   • GASTROSCOPY  12/5/2018    Procedure: GASTROSCOPY;  Surgeon: Terell Vera M.D.;  Location: [facility];  Service: EUS   • EGD W/ENDOSCOPIC ULTRASOUND  12/5/2018    Procedure: EGD W/ENDOSCOPIC ULTRASOUND;  Surgeon: Terell Vera M.D.;  Location: [facility];  Service: EUS   • EGD WITH ASP/BX  12/5/2018    Procedure: EGD WITH ASP/BX - FNA OF DUODENAL LESION;  Surgeon: Terell Vera M.D.;  Location: [facility];  Service: EUS   • OTHER ABDOMINAL SURGERY  2018    umbilical hernia repair   • CARPAL TUNNEL RELEASE Right 12/17/2015    Procedure: CARPAL TUNNEL RELEASE;  Surgeon: Jeromy Santana D.O.;  Location: [facility];  Service:    • APPENDECTOMY      • CATARACT EXTRACTION WITH IOL Bilateral    • PROSTATE CANCER BIOMARKER      seed implants prostate     History reviewed. No pertinent family history.  Social History     Socioeconomic History   • Marital status:      Spouse name: Not on file   • Number of children: Not on file   • Years of education: Not on file   • Highest education level: Not on file   Occupational History   • Not on file   Social Needs   • Financial resource strain: Not on file   • Food insecurity:     Worry: Not on file     Inability: Not on file   • Transportation needs:     Medical: Not on file     Non-medical: Not on file   Tobacco Use   • Smoking status: Former Smoker     Packs/day: 2.00     Years: 15.00     Pack years: 30.00     Types: Cigarettes     Last attempt to quit: 1966     Years since quittin.0   • Smokeless tobacco: Never Used   Substance and Sexual Activity   • Alcohol use: Yes     Comment: 2 drinks a month   • Drug use: No   • Sexual activity: Not on file     Comment:    Lifestyle   • Physical activity:     Days per week: Not on file     Minutes per session: Not on file   • Stress: Not on file   Relationships   • Social connections:     Talks on phone: Not on file     Gets together: Not on file     Attends Sabianism service: Not on file     Active member of club or organization: Not on file     Attends meetings of clubs or organizations: Not on file     Relationship status: Not on file   • Intimate partner violence:     Fear of current or ex partner: Not on file     Emotionally abused: Not on file     Physically abused: Not on file     Forced sexual activity: Not on file   Other Topics Concern   • Not on file   Social History Narrative   • Not on file     No Known Allergies  Outpatient Encounter Medications as of 2020   Medication Sig Dispense Refill   •  "lisinopril (PRINIVIL, ZESTRIL) 40 MG tablet TAKE 1 TABLET DAILY (Patient taking differently: 40 mg every morning.) 90 Tab 3   • amLODIPine (NORVASC) 5 MG Tab TAKE 1 TABLET DAILY (Patient taking differently: Take 5 mg by mouth every evening.) 90 Tab 3   • terazosin (HYTRIN) 5 MG Cap Take 5 mg by mouth every evening.     • rivaroxaban (XARELTO) 10 MG Tab tablet Take 10 mg by mouth.     • lovastatin (MEVACOR) 20 MG Tab Take 20 mg by mouth every evening.     • ascorbic acid (ASCORBIC ACID) 500 MG Tab Take 500 mg by mouth every day.     • duloxetine (CYMBALTA) 30 MG Cap DR Particles Take 30 mg by mouth every day.     • REQUIP 4 MG tablet      • loratadine (CLARITIN) 10 MG Tab      • terazosin (HYTRIN) 10 MG capsule Take 10 mg by mouth every morning.     • famotidine (PEPCID) 20 MG TABS Take 20 mg by mouth 2 times a day.       No facility-administered encounter medications on file as of 1/14/2020.      Review of Systems   Constitutional: Negative for malaise/fatigue.   Respiratory: Negative for shortness of breath.    Cardiovascular: Negative for chest pain, palpitations, orthopnea, leg swelling and PND.   Gastrointestinal: Negative for abdominal pain.   Musculoskeletal: Negative for falls.   Neurological: Negative for dizziness and loss of consciousness.   Psychiatric/Behavioral: Negative for depression.   All other systems reviewed and are negative.       Objective:   /70 (BP Location: Right arm, Patient Position: Sitting)   Pulse 100   Ht 1.753 m (5' 9\")   Wt 94.3 kg (208 lb)   SpO2 92%   BMI 30.72 kg/m²      Physical Exam   Constitutional: He is oriented to person, place, and time. He appears well-developed and well-nourished. No distress.   HENT:   Head: Normocephalic and atraumatic.   Eyes: Conjunctivae are normal. No scleral icterus.   Neck: Normal range of motion. Neck supple.   Cardiovascular: Normal rate, regular rhythm and normal heart sounds. Exam reveals no gallop and no friction rub.   No murmur " heard.  Pulmonary/Chest: Effort normal and breath sounds normal. No respiratory distress. He has no wheezes. He has no rales.   Abdominal: Soft. He exhibits no distension. There is no tenderness.   Musculoskeletal:         General: No edema.   Neurological: He is alert and oriented to person, place, and time.   Skin: Skin is warm and dry. He is not diaphoretic.   Psychiatric: He has a normal mood and affect. His behavior is normal.   Nursing note and vitals reviewed.    Echocardiogram in April 2017 showed mild concentric LVH. EF 70%.  Mild RVH with mild reduction in RV function. RVSP 34 mmHg.    Holter monitor performed in September 2018 showed sinus rhythm with an average heart rate of 92 bpm.  No arrhythmias noted.    Labs performed in December 2019 were reviewed and showed normal creatinine and potassium.    EKG performed 12/6/2019 at 10:18 AM was personally reviewed and per my interpretation shows sinus rhythm in the 80s beats per minute.  Normal EKG    Assessment:     1. Essential hypertension     2. GALE (obstructive sleep apnea)     3. Other hyperlipidemia  Lipid Profile   4. Encounter for long-term (current) use of high-risk medication         Medical Decision Making:  Today's Assessment / Status / Plan:     Hypertension: Blood pressure is at goal.  Continue current regimen including lisinopril.  His renal function is normal.    Hyperlipidemia: Lipid panel ordered today.  Continue lovastatin at current dose.    Obstructive sleep apnea: Patient has been using his CPAP on a daily basis.  Encouraged ongoing compliance.    Return to clinic if needed.  Patient is moving to [place].     Thank you for allowing me to participate in the care of this patient. Please do not hesitate to contact me with any questions.    Anitra Ellis MD, [facility]  Cardiologist  [facility]    PLEASE NOTE: This dictation was created using voice recognition software.         Althea Jordan M.D.  [address] [place] [address]  VIA Facsimile: [phone]

## 2020-01-14 NOTE — PROGRESS NOTES
Chief Complaint   Patient presents with   • HTN (Controlled)       Subjective:   Feliciano Rojas is a 84 y.o. male who presents today to follow-up on hypertension.    Pertinent history:  Hypertension  Hyperlipidemia  Obstructive sleep apnea on CPAP      Patient has been doing well since his last appointment.  He is planning on moving to [place] to be closer to his daughter.  His blood pressures have been well controlled, like today.  For his hyperlipidemia he is on lovastatin which he is tolerating well.  He has been using his CPAP on a daily basis.  He has recently started walking more.  Denies any anginal symptoms with exercise.    He was diagnosed with a DVT and is currently on Xarelto.    Past Medical History:   Diagnosis Date   • Arthritis     lower back   • BPH (benign prostatic hyperplasia)    • Cancer (HCC) 2001    prostate ca; treated with seed implants   • Cataract 2014    bilateral IOLI   • High cholesterol    • Hypertension    • Indigestion    • Pneumonia 2010   • Sleep apnea     CPAP at night    • Vertigo      Past Surgical History:   Procedure Laterality Date   • GASTROSCOPY  12/5/2018    Procedure: GASTROSCOPY;  Surgeon: Terell Vera M.D.;  Location: [facility];  Service: EUS   • EGD W/ENDOSCOPIC ULTRASOUND  12/5/2018    Procedure: EGD W/ENDOSCOPIC ULTRASOUND;  Surgeon: Terell Vera M.D.;  Location: [facility];  Service: EUS   • EGD WITH ASP/BX  12/5/2018    Procedure: EGD WITH ASP/BX - FNA OF DUODENAL LESION;  Surgeon: Terell Vera M.D.;  Location: [facility];  Service: EUS   • OTHER ABDOMINAL SURGERY  2018    umbilical hernia repair   • CARPAL TUNNEL RELEASE Right 12/17/2015    Procedure: CARPAL TUNNEL RELEASE;  Surgeon: Jeromy Santana D.O.;  Location: [facility];  Service:    • APPENDECTOMY      • CATARACT EXTRACTION WITH IOL Bilateral    • PROSTATE CANCER BIOMARKER      seed implants prostate     History reviewed. No pertinent family  history.  Social History     Socioeconomic History   • Marital status:      Spouse name: Not on file   • Number of children: Not on file   • Years of education: Not on file   • Highest education level: Not on file   Occupational History   • Not on file   Social Needs   • Financial resource strain: Not on file   • Food insecurity:     Worry: Not on file     Inability: Not on file   • Transportation needs:     Medical: Not on file     Non-medical: Not on file   Tobacco Use   • Smoking status: Former Smoker     Packs/day: 2.00     Years: 15.00     Pack years: 30.00     Types: Cigarettes     Last attempt to quit: 1966     Years since quittin.0   • Smokeless tobacco: Never Used   Substance and Sexual Activity   • Alcohol use: Yes     Comment: 2 drinks a month   • Drug use: No   • Sexual activity: Not on file     Comment:    Lifestyle   • Physical activity:     Days per week: Not on file     Minutes per session: Not on file   • Stress: Not on file   Relationships   • Social connections:     Talks on phone: Not on file     Gets together: Not on file     Attends [employer] service: Not on file     Active member of club or organization: Not on file     Attends meetings of clubs or organizations: Not on file     Relationship status: Not on file   • Intimate partner violence:     Fear of current or ex partner: Not on file     Emotionally abused: Not on file     Physically abused: Not on file     Forced sexual activity: Not on file   Other Topics Concern   • Not on file   Social History Narrative   • Not on file     No Known Allergies  Outpatient Encounter Medications as of 2020   Medication Sig Dispense Refill   • lisinopril (PRINIVIL, ZESTRIL) 40 MG tablet TAKE 1 TABLET DAILY (Patient taking differently: 40 mg every morning.) 90 Tab 3   • amLODIPine (NORVASC) 5 MG Tab TAKE 1 TABLET DAILY (Patient taking differently: Take 5 mg by mouth every evening.) 90 Tab 3   • terazosin (HYTRIN) 5 MG Cap Take 5  "mg by mouth every evening.     • rivaroxaban (XARELTO) 10 MG Tab tablet Take 10 mg by mouth.     • lovastatin (MEVACOR) 20 MG Tab Take 20 mg by mouth every evening.     • ascorbic acid (ASCORBIC ACID) 500 MG Tab Take 500 mg by mouth every day.     • duloxetine (CYMBALTA) 30 MG Cap DR Particles Take 30 mg by mouth every day.     • REQUIP 4 MG tablet      • loratadine (CLARITIN) 10 MG Tab      • terazosin (HYTRIN) 10 MG capsule Take 10 mg by mouth every morning.     • famotidine (PEPCID) 20 MG TABS Take 20 mg by mouth 2 times a day.       No facility-administered encounter medications on file as of 1/14/2020.      Review of Systems   Constitutional: Negative for malaise/fatigue.   Respiratory: Negative for shortness of breath.    Cardiovascular: Negative for chest pain, palpitations, orthopnea, leg swelling and PND.   Gastrointestinal: Negative for abdominal pain.   Musculoskeletal: Negative for falls.   Neurological: Negative for dizziness and loss of consciousness.   Psychiatric/Behavioral: Negative for depression.   All other systems reviewed and are negative.       Objective:   /70 (BP Location: Right arm, Patient Position: Sitting)   Pulse 100   Ht 1.753 m (5' 9\")   Wt 94.3 kg (208 lb)   SpO2 92%   BMI 30.72 kg/m²     Physical Exam   Constitutional: He is oriented to person, place, and time. He appears well-developed and well-nourished. No distress.   HENT:   Head: Normocephalic and atraumatic.   Eyes: Conjunctivae are normal. No scleral icterus.   Neck: Normal range of motion. Neck supple.   Cardiovascular: Normal rate, regular rhythm and normal heart sounds. Exam reveals no gallop and no friction rub.   No murmur heard.  Pulmonary/Chest: Effort normal and breath sounds normal. No respiratory distress. He has no wheezes. He has no rales.   Abdominal: Soft. He exhibits no distension. There is no tenderness.   Musculoskeletal:         General: No edema.   Neurological: He is alert and oriented to " person, place, and time.   Skin: Skin is warm and dry. He is not diaphoretic.   Psychiatric: He has a normal mood and affect. His behavior is normal.   Nursing note and vitals reviewed.    Echocardiogram in April 2017 showed mild concentric LVH. EF 70%.  Mild RVH with mild reduction in RV function. RVSP 34 mmHg.    Holter monitor performed in September 2018 showed sinus rhythm with an average heart rate of 92 bpm.  No arrhythmias noted.    Labs performed in December 2019 were reviewed and showed normal creatinine and potassium.    EKG performed 12/6/2019 at 10:18 AM was personally reviewed and per my interpretation shows sinus rhythm in the 80s beats per minute.  Normal EKG    Assessment:     1. Essential hypertension     2. GALE (obstructive sleep apnea)     3. Other hyperlipidemia  Lipid Profile   4. Encounter for long-term (current) use of high-risk medication         Medical Decision Making:  Today's Assessment / Status / Plan:     Hypertension: Blood pressure is at goal.  Continue current regimen including lisinopril.  His renal function is normal.    Hyperlipidemia: Lipid panel ordered today.  Continue lovastatin at current dose.    Obstructive sleep apnea: Patient has been using his CPAP on a daily basis.  Encouraged ongoing compliance.    Return to clinic if needed.  Patient is moving to [place].     Thank you for allowing me to participate in the care of this patient. Please do not hesitate to contact me with any questions.    Anitra Ellis MD, [facility]  Cardiologist  [facility]    PLEASE NOTE: This dictation was created using voice recognition software.

## 2020-04-11 DIAGNOSIS — I10 ESSENTIAL HYPERTENSION: ICD-10-CM

## 2020-04-13 RX ORDER — AMLODIPINE BESYLATE 5 MG/1
TABLET ORAL
Qty: 90 TAB | Refills: 3 | Status: SHIPPED | OUTPATIENT
Start: 2020-04-13

## 2020-04-13 RX ORDER — LISINOPRIL 40 MG/1
TABLET ORAL
Qty: 90 TAB | Refills: 3 | Status: SHIPPED | OUTPATIENT
Start: 2020-04-13

## 2023-05-02 ENCOUNTER — OFFICE VISIT (OUTPATIENT)
Dept: URBAN - METROPOLITAN AREA CLINIC 46 | Facility: CLINIC | Age: 88
End: 2023-05-02
Payer: MEDICARE

## 2023-05-02 DIAGNOSIS — H10.45 OTHER CHRONIC ALLERGIC CONJUNCTIVITIS: ICD-10-CM

## 2023-05-02 DIAGNOSIS — E11.9 DIABETES MELLITUS TYPE 2 WITHOUT MENTION OF COMPLICATION: Primary | ICD-10-CM

## 2023-05-02 DIAGNOSIS — H43.393 OTHER VITREOUS OPACITIES, BILATERAL: ICD-10-CM

## 2023-05-02 DIAGNOSIS — H35.3131 NONEXUDATIVE MACULAR DEGENERATION, EARLY DRY STAGE, BILATERAL: ICD-10-CM

## 2023-05-02 DIAGNOSIS — Z96.1 PRESENCE OF INTRAOCULAR LENS: ICD-10-CM

## 2023-05-02 PROCEDURE — 99204 OFFICE O/P NEW MOD 45 MIN: CPT | Performed by: OPTOMETRIST

## 2023-05-02 PROCEDURE — 92134 CPTRZ OPH DX IMG PST SGM RTA: CPT | Performed by: OPTOMETRIST

## 2023-05-02 ASSESSMENT — INTRAOCULAR PRESSURE
OD: 21
OS: 18

## 2023-05-02 NOTE — IMPRESSION/PLAN
Impression: Other vitreous opacities, bilateral: H43.393. Plan: Advised patient that floaters will be monitored. If more floaters, flashes or a grey curtain appear. Call to make an appointment. Will continue to monitor.

## 2023-05-02 NOTE — IMPRESSION/PLAN
Impression: Nonexudative macular degeneration, early dry stage, bilateral: H35.3131. Plan: Macular degeneration, dry type with stable vision. Will continue to monitor vision and the patient has been instructed to call with any vision changes. Recommend the supplements AREDS II and a green, leafy diet. Highly recommended not to begin smoking. Mac OCT performed at today's visit, baseline testing to monitor for any progressions or advancements.

## 2023-05-02 NOTE — IMPRESSION/PLAN
Impression: Diabetes mellitus Type 2 without mention of complication: M41.6. Plan: Diabetes type II: No background retinopathy, no signs of neovascularization noted. Discussed ocular and systemic benefits of blood sugar control. Discussed risks of progression. Patient to call if signs are symptoms should arise.

## 2023-05-24 ENCOUNTER — OFFICE VISIT (OUTPATIENT)
Dept: URBAN - METROPOLITAN AREA CLINIC 50 | Facility: CLINIC | Age: 88
End: 2023-05-24
Payer: MEDICARE

## 2023-05-24 DIAGNOSIS — H52.4 PRESBYOPIA: Primary | ICD-10-CM

## 2023-05-24 ASSESSMENT — KERATOMETRY
OS: 42.16
OD: 41.65

## 2023-05-24 ASSESSMENT — VISUAL ACUITY
OD: 20/25
OS: 20/25

## 2023-06-13 ENCOUNTER — OFFICE VISIT (OUTPATIENT)
Dept: URBAN - METROPOLITAN AREA CLINIC 46 | Facility: CLINIC | Age: 88
End: 2023-06-13
Payer: MEDICARE

## 2023-06-13 DIAGNOSIS — H52.4 PRESBYOPIA: Primary | ICD-10-CM

## 2023-06-13 PROCEDURE — 92015 DETERMINE REFRACTIVE STATE: CPT | Performed by: OPTOMETRIST

## 2023-06-13 ASSESSMENT — KERATOMETRY
OD: 41.65
OS: 41.93

## 2023-06-13 ASSESSMENT — VISUAL ACUITY
OD: 20/25
OS: 20/30

## 2023-07-24 ENCOUNTER — OFFICE VISIT (OUTPATIENT)
Dept: URBAN - METROPOLITAN AREA CLINIC 46 | Facility: CLINIC | Age: 88
End: 2023-07-24
Payer: MEDICARE

## 2023-07-24 DIAGNOSIS — H52.4 PRESBYOPIA: Primary | ICD-10-CM

## 2023-07-24 PROCEDURE — 92015 DETERMINE REFRACTIVE STATE: CPT | Performed by: OPTOMETRIST

## 2023-07-24 ASSESSMENT — VISUAL ACUITY
OD: 20/20
OS: 20/25

## (undated) DEVICE — TUBE CONNECTING SUCTION - CLEAR PLASTIC STERILE 72 IN (50EA/CA)

## (undated) DEVICE — GOLD PROBE 7FR (5/BX)

## (undated) DEVICE — KIT  I.V. START (100EA/CA)

## (undated) DEVICE — TUBING CLEARLINK DUO-VENT - C-FLO (48EA/CA)

## (undated) DEVICE — CANNULA W/ SUPPLY TUBING O2 - (50/CA)

## (undated) DEVICE — SET EXTENSION WITH 2 PORTS (48EA/CA) ***PART #2C8610 IS A SUBSTITUTE*****

## (undated) DEVICE — SYRINGE SAFETY 10 ML 18 GA X 1 1/2 BLUNT LL (100/BX 4BX/CA)

## (undated) DEVICE — SPONGE GAUZE NON-STERILE 4X4 - (2000/CA 10PK/CA)

## (undated) DEVICE — GLOVE, LITE (PAIR)

## (undated) DEVICE — SYRINGE SAFETY 3 ML 18 GA X 1 1/2 BLUNT LL (100/BX 8BX/CA)

## (undated) DEVICE — MASK WITH FACE SHIELD (25/BX 4BX/CA)

## (undated) DEVICE — BASIN EMESIS DISP. - (250/CA)

## (undated) DEVICE — CATHETER IV SAFETY 20 GA X 1-1/4 (50/BX)

## (undated) DEVICE — NEPTUNE 4 PORT MANIFOLD - (20/PK)

## (undated) DEVICE — LACTATED RINGERS INJ 1000 ML - (14EA/CA 60CA/PF)

## (undated) DEVICE — SYRINGE DISP. 50CC LS - (40/BX)

## (undated) DEVICE — BITE BLOCK ADULT 60FR (100EA/CA)

## (undated) DEVICE — MASK ANESTHESIA ADULT  - (100/CA)

## (undated) DEVICE — ELECTRODE 850 FOAM ADHESIVE - HYDROGEL RADIOTRNSPRNT (50/PK)

## (undated) DEVICE — CANISTER SUCTION RIGID RED 1500CC (40EA/CA)

## (undated) DEVICE — SENSOR SPO2 ADULT LNCS ADTX (20/BX) ORDER ITEM #19593

## (undated) DEVICE — SYRINGE SAFETY 5 ML 18 GA X 1-1/2 BLUNT LL (100/BX 4BX/CA)

## (undated) DEVICE — TRAP POLYP E-TRAP (25EA/BX)

## (undated) DEVICE — KIT ANESTHESIA W/CIRCUIT & 3/LT BAG W/FILTER (20EA/CA)

## (undated) DEVICE — GOWN SURGEONS LARGE - (32/CA)

## (undated) DEVICE — TUBE SUCTION YANKAUER  1/4 X 6FT (20EA/CA)"